# Patient Record
Sex: FEMALE | Race: WHITE | Employment: OTHER | URBAN - METROPOLITAN AREA
[De-identification: names, ages, dates, MRNs, and addresses within clinical notes are randomized per-mention and may not be internally consistent; named-entity substitution may affect disease eponyms.]

---

## 2019-05-06 PROBLEM — R10.30 LOWER ABDOMINAL PAIN: Status: ACTIVE | Noted: 2019-05-06

## 2019-05-06 PROBLEM — S83.511A RUPTURE OF ANTERIOR CRUCIATE LIGAMENT OF RIGHT KNEE: Status: ACTIVE | Noted: 2019-05-06

## 2019-06-11 ENCOUNTER — HOSPITAL ENCOUNTER (OUTPATIENT)
Dept: LAB | Age: 49
Discharge: HOME | End: 2019-06-11

## 2019-06-11 PROCEDURE — 88305 TISSUE EXAM BY PATHOLOGIST: CPT

## 2019-06-18 ENCOUNTER — HOSPITAL ENCOUNTER (OUTPATIENT)
Dept: GENERAL RADIOLOGY | Age: 49
Discharge: HOME | End: 2019-06-18
Attending: INTERNAL MEDICINE
Payer: MEDICARE

## 2019-06-18 DIAGNOSIS — M45.9 ANKYLOSING SPONDYLITIS, UNSPECIFIED SITE OF SPINE (HCC): ICD-10-CM

## 2019-06-18 PROCEDURE — 72040 X-RAY EXAM NECK SPINE 2-3 VW: CPT

## 2019-06-18 PROCEDURE — 73630 X-RAY EXAM OF FOOT: CPT

## 2019-06-18 PROCEDURE — 72170 X-RAY EXAM OF PELVIS: CPT

## 2019-06-18 PROCEDURE — 72100 X-RAY EXAM L-S SPINE 2/3 VWS: CPT

## 2019-06-18 PROCEDURE — 73130 X-RAY EXAM OF HAND: CPT

## 2019-06-18 PROCEDURE — 72072 X-RAY EXAM THORAC SPINE 3VWS: CPT

## 2019-06-19 RX ORDER — SODIUM CHLORIDE 0.9 % (FLUSH) 0.9 %
5-40 SYRINGE (ML) INJECTION AS NEEDED
Status: CANCELLED | OUTPATIENT
Start: 2019-06-19

## 2019-06-19 RX ORDER — SODIUM CHLORIDE 0.9 % (FLUSH) 0.9 %
5-40 SYRINGE (ML) INJECTION EVERY 8 HOURS
Status: CANCELLED | OUTPATIENT
Start: 2019-06-19

## 2019-06-21 ENCOUNTER — ANESTHESIA EVENT (OUTPATIENT)
Dept: SURGERY | Age: 49
End: 2019-06-21
Payer: MEDICARE

## 2019-06-24 ENCOUNTER — ANESTHESIA (OUTPATIENT)
Dept: SURGERY | Age: 49
End: 2019-06-24
Payer: MEDICARE

## 2019-06-24 ENCOUNTER — HOSPITAL ENCOUNTER (OUTPATIENT)
Age: 49
Setting detail: OUTPATIENT SURGERY
Discharge: HOME | End: 2019-06-24
Attending: ORTHOPAEDIC SURGERY | Admitting: ORTHOPAEDIC SURGERY
Payer: MEDICARE

## 2019-06-24 VITALS
WEIGHT: 175 LBS | DIASTOLIC BLOOD PRESSURE: 67 MMHG | TEMPERATURE: 98.4 F | HEIGHT: 67 IN | BODY MASS INDEX: 27.47 KG/M2 | RESPIRATION RATE: 17 BRPM | SYSTOLIC BLOOD PRESSURE: 134 MMHG | OXYGEN SATURATION: 98 % | HEART RATE: 76 BPM

## 2019-06-24 PROCEDURE — 77030006891 HC BLD SHV RESECT STRY -B: Performed by: ORTHOPAEDIC SURGERY

## 2019-06-24 PROCEDURE — 77030019908 HC STETH ESOPH SIMS -A: Performed by: REGISTERED NURSE

## 2019-06-24 PROCEDURE — 76210000063 HC OR PH I REC FIRST 0.5 HR: Performed by: ORTHOPAEDIC SURGERY

## 2019-06-24 PROCEDURE — 77030018836 HC SOL IRR NACL ICUM -A: Performed by: ORTHOPAEDIC SURGERY

## 2019-06-24 PROCEDURE — 76010010054 HC POST OP PAIN BLOCK

## 2019-06-24 PROCEDURE — 74011000250 HC RX REV CODE- 250

## 2019-06-24 PROCEDURE — 77030029203 HC IRR KT CYSTO/TUR BBMI -A: Performed by: ORTHOPAEDIC SURGERY

## 2019-06-24 PROCEDURE — 77030002966 HC SUT PDS J&J -A: Performed by: ORTHOPAEDIC SURGERY

## 2019-06-24 PROCEDURE — 77030004453 HC BUR SHV STRY -B: Performed by: ORTHOPAEDIC SURGERY

## 2019-06-24 PROCEDURE — 76210000020 HC REC RM PH II FIRST 0.5 HR: Performed by: ORTHOPAEDIC SURGERY

## 2019-06-24 PROCEDURE — C1713 ANCHOR/SCREW BN/BN,TIS/BN: HCPCS | Performed by: ORTHOPAEDIC SURGERY

## 2019-06-24 PROCEDURE — 77030033138 HC SUT PGA STRATFX J&J -B: Performed by: ORTHOPAEDIC SURGERY

## 2019-06-24 PROCEDURE — 76942 ECHO GUIDE FOR BIOPSY: CPT | Performed by: ORTHOPAEDIC SURGERY

## 2019-06-24 PROCEDURE — 77030000032 HC CUF TRNQT ZIMM -B: Performed by: ORTHOPAEDIC SURGERY

## 2019-06-24 PROCEDURE — 77030003602 HC NDL NRV BLK BBMI -B: Performed by: ANESTHESIOLOGY

## 2019-06-24 PROCEDURE — 77030018986 HC SUT ETHBND4 J&J -B: Performed by: ORTHOPAEDIC SURGERY

## 2019-06-24 PROCEDURE — 76010010054 HC POST OP PAIN BLOCK: Performed by: ORTHOPAEDIC SURGERY

## 2019-06-24 PROCEDURE — 74011250636 HC RX REV CODE- 250/636: Performed by: ANESTHESIOLOGY

## 2019-06-24 PROCEDURE — 76010000162 HC OR TIME 1.5 TO 2 HR INTENSV-TIER 1: Performed by: ORTHOPAEDIC SURGERY

## 2019-06-24 PROCEDURE — 77030027384 HC PRB ARTHSCP SERFAS STRY -C: Performed by: ORTHOPAEDIC SURGERY

## 2019-06-24 PROCEDURE — 74011250636 HC RX REV CODE- 250/636

## 2019-06-24 PROCEDURE — C1762 CONN TISS, HUMAN(INC FASCIA): HCPCS | Performed by: ORTHOPAEDIC SURGERY

## 2019-06-24 PROCEDURE — 77030006788 HC BLD SAW OSC STRY -B: Performed by: ORTHOPAEDIC SURGERY

## 2019-06-24 PROCEDURE — 77030002982 HC SUT POLYSRB J&J -A: Performed by: ORTHOPAEDIC SURGERY

## 2019-06-24 PROCEDURE — 77030010430: Performed by: ORTHOPAEDIC SURGERY

## 2019-06-24 PROCEDURE — 74011250637 HC RX REV CODE- 250/637: Performed by: ANESTHESIOLOGY

## 2019-06-24 PROCEDURE — 76060000034 HC ANESTHESIA 1.5 TO 2 HR: Performed by: ORTHOPAEDIC SURGERY

## 2019-06-24 PROCEDURE — 77030006590 HC BLD ARTHSC GRFT J&J -C: Performed by: ORTHOPAEDIC SURGERY

## 2019-06-24 PROCEDURE — 74011250636 HC RX REV CODE- 250/636: Performed by: ORTHOPAEDIC SURGERY

## 2019-06-24 DEVICE — 7 X 20 MM BIORCI SCREW
Type: IMPLANTABLE DEVICE | Site: KNEE | Status: FUNCTIONAL
Brand: BIORCI

## 2019-06-24 DEVICE — GRAFT BNE L10XW25XH10MM BNE TEND BNE PRESHAPED: Type: IMPLANTABLE DEVICE | Site: KNEE | Status: FUNCTIONAL

## 2019-06-24 RX ORDER — PROPOFOL 10 MG/ML
INJECTION, EMULSION INTRAVENOUS AS NEEDED
Status: DISCONTINUED | OUTPATIENT
Start: 2019-06-24 | End: 2019-06-24 | Stop reason: HOSPADM

## 2019-06-24 RX ORDER — FENTANYL CITRATE 50 UG/ML
100 INJECTION, SOLUTION INTRAMUSCULAR; INTRAVENOUS ONCE
Status: COMPLETED | OUTPATIENT
Start: 2019-06-24 | End: 2019-06-24

## 2019-06-24 RX ORDER — EPHEDRINE SULFATE 50 MG/ML
INJECTION, SOLUTION INTRAVENOUS AS NEEDED
Status: DISCONTINUED | OUTPATIENT
Start: 2019-06-24 | End: 2019-06-24 | Stop reason: HOSPADM

## 2019-06-24 RX ORDER — FAMOTIDINE 20 MG/1
20 TABLET, FILM COATED ORAL ONCE
Status: COMPLETED | OUTPATIENT
Start: 2019-06-24 | End: 2019-06-24

## 2019-06-24 RX ORDER — SODIUM CHLORIDE, SODIUM LACTATE, POTASSIUM CHLORIDE, CALCIUM CHLORIDE 600; 310; 30; 20 MG/100ML; MG/100ML; MG/100ML; MG/100ML
150 INJECTION, SOLUTION INTRAVENOUS CONTINUOUS
Status: DISCONTINUED | OUTPATIENT
Start: 2019-06-24 | End: 2019-06-24 | Stop reason: HOSPADM

## 2019-06-24 RX ORDER — SODIUM CHLORIDE 9 MG/ML
50 INJECTION, SOLUTION INTRAVENOUS CONTINUOUS
Status: DISCONTINUED | OUTPATIENT
Start: 2019-06-24 | End: 2019-06-24 | Stop reason: HOSPADM

## 2019-06-24 RX ORDER — LIDOCAINE HYDROCHLORIDE 10 MG/ML
0.1 INJECTION INFILTRATION; PERINEURAL AS NEEDED
Status: DISCONTINUED | OUTPATIENT
Start: 2019-06-24 | End: 2019-06-24 | Stop reason: HOSPADM

## 2019-06-24 RX ORDER — ACETAMINOPHEN 500 MG
1000 TABLET ORAL
Status: DISCONTINUED | OUTPATIENT
Start: 2019-06-24 | End: 2019-06-24 | Stop reason: HOSPADM

## 2019-06-24 RX ORDER — SODIUM CHLORIDE 0.9 % (FLUSH) 0.9 %
5-40 SYRINGE (ML) INJECTION AS NEEDED
Status: DISCONTINUED | OUTPATIENT
Start: 2019-06-24 | End: 2019-06-24 | Stop reason: HOSPADM

## 2019-06-24 RX ORDER — DEXAMETHASONE SODIUM PHOSPHATE 4 MG/ML
INJECTION, SOLUTION INTRA-ARTICULAR; INTRALESIONAL; INTRAMUSCULAR; INTRAVENOUS; SOFT TISSUE AS NEEDED
Status: DISCONTINUED | OUTPATIENT
Start: 2019-06-24 | End: 2019-06-24 | Stop reason: HOSPADM

## 2019-06-24 RX ORDER — ONDANSETRON 2 MG/ML
INJECTION INTRAMUSCULAR; INTRAVENOUS AS NEEDED
Status: DISCONTINUED | OUTPATIENT
Start: 2019-06-24 | End: 2019-06-24 | Stop reason: HOSPADM

## 2019-06-24 RX ORDER — SODIUM CHLORIDE 0.9 % (FLUSH) 0.9 %
5-40 SYRINGE (ML) INJECTION EVERY 8 HOURS
Status: DISCONTINUED | OUTPATIENT
Start: 2019-06-24 | End: 2019-06-24 | Stop reason: HOSPADM

## 2019-06-24 RX ORDER — CEFAZOLIN SODIUM/WATER 2 G/20 ML
2 SYRINGE (ML) INTRAVENOUS ONCE
Status: COMPLETED | OUTPATIENT
Start: 2019-06-24 | End: 2019-06-24

## 2019-06-24 RX ORDER — LIDOCAINE HYDROCHLORIDE 20 MG/ML
INJECTION, SOLUTION EPIDURAL; INFILTRATION; INTRACAUDAL; PERINEURAL AS NEEDED
Status: DISCONTINUED | OUTPATIENT
Start: 2019-06-24 | End: 2019-06-24 | Stop reason: HOSPADM

## 2019-06-24 RX ORDER — MIDAZOLAM HYDROCHLORIDE 1 MG/ML
2 INJECTION, SOLUTION INTRAMUSCULAR; INTRAVENOUS
Status: COMPLETED | OUTPATIENT
Start: 2019-06-24 | End: 2019-06-24

## 2019-06-24 RX ORDER — HYDROCODONE BITARTRATE AND ACETAMINOPHEN 5; 325 MG/1; MG/1
1 TABLET ORAL AS NEEDED
Status: DISCONTINUED | OUTPATIENT
Start: 2019-06-24 | End: 2019-06-24 | Stop reason: HOSPADM

## 2019-06-24 RX ORDER — HYDROMORPHONE HYDROCHLORIDE 2 MG/ML
0.5 INJECTION, SOLUTION INTRAMUSCULAR; INTRAVENOUS; SUBCUTANEOUS
Status: DISCONTINUED | OUTPATIENT
Start: 2019-06-24 | End: 2019-06-24 | Stop reason: HOSPADM

## 2019-06-24 RX ADMIN — EPHEDRINE SULFATE 10 MG: 50 INJECTION, SOLUTION INTRAVENOUS at 08:57

## 2019-06-24 RX ADMIN — FENTANYL CITRATE 100 MCG: 50 INJECTION INTRAMUSCULAR; INTRAVENOUS at 07:24

## 2019-06-24 RX ADMIN — ONDANSETRON 4 MG: 2 INJECTION INTRAMUSCULAR; INTRAVENOUS at 09:48

## 2019-06-24 RX ADMIN — SODIUM CHLORIDE, SODIUM LACTATE, POTASSIUM CHLORIDE, AND CALCIUM CHLORIDE 150 ML/HR: 600; 310; 30; 20 INJECTION, SOLUTION INTRAVENOUS at 06:30

## 2019-06-24 RX ADMIN — ACETAMINOPHEN 1000 MG: 500 TABLET, FILM COATED ORAL at 10:39

## 2019-06-24 RX ADMIN — FAMOTIDINE 20 MG: 20 TABLET ORAL at 07:15

## 2019-06-24 RX ADMIN — EPHEDRINE SULFATE 5 MG: 50 INJECTION, SOLUTION INTRAVENOUS at 09:45

## 2019-06-24 RX ADMIN — EPHEDRINE SULFATE 5 MG: 50 INJECTION, SOLUTION INTRAVENOUS at 09:06

## 2019-06-24 RX ADMIN — DEXAMETHASONE SODIUM PHOSPHATE 4 MG: 4 INJECTION, SOLUTION INTRA-ARTICULAR; INTRALESIONAL; INTRAMUSCULAR; INTRAVENOUS; SOFT TISSUE at 08:46

## 2019-06-24 RX ADMIN — SODIUM CHLORIDE, SODIUM LACTATE, POTASSIUM CHLORIDE, AND CALCIUM CHLORIDE: 600; 310; 30; 20 INJECTION, SOLUTION INTRAVENOUS at 09:46

## 2019-06-24 RX ADMIN — EPHEDRINE SULFATE 5 MG: 50 INJECTION, SOLUTION INTRAVENOUS at 09:30

## 2019-06-24 RX ADMIN — PROPOFOL 200 MG: 10 INJECTION, EMULSION INTRAVENOUS at 08:40

## 2019-06-24 RX ADMIN — EPHEDRINE SULFATE 5 MG: 50 INJECTION, SOLUTION INTRAVENOUS at 08:54

## 2019-06-24 RX ADMIN — LIDOCAINE HYDROCHLORIDE 100 MG: 20 INJECTION, SOLUTION EPIDURAL; INFILTRATION; INTRACAUDAL; PERINEURAL at 08:39

## 2019-06-24 RX ADMIN — Medication 2 G: at 08:46

## 2019-06-24 RX ADMIN — MIDAZOLAM HYDROCHLORIDE 2 MG: 2 INJECTION, SOLUTION INTRAMUSCULAR; INTRAVENOUS at 07:24

## 2019-06-24 NOTE — ANESTHESIA PREPROCEDURE EVALUATION
Relevant Problems   No relevant active problems       Anesthetic History   No history of anesthetic complications            Review of Systems / Medical History  Patient summary reviewed and pertinent labs reviewed    Pulmonary  Within defined limits                 Neuro/Psych         Psychiatric history (anxiety)     Cardiovascular  Within defined limits                Exercise tolerance: >4 METS     GI/Hepatic/Renal     GERD: well controlled           Endo/Other  Within defined limits           Other Findings              Physical Exam    Airway  Mallampati: II  TM Distance: 4 - 6 cm  Neck ROM: normal range of motion   Mouth opening: Normal     Cardiovascular  Regular rate and rhythm,  S1 and S2 normal,  no murmur, click, rub, or gallop  Rhythm: regular  Rate: normal         Dental  No notable dental hx       Pulmonary  Breath sounds clear to auscultation               Abdominal         Other Findings            Anesthetic Plan    ASA: 2  Anesthesia type: general      Post-op pain plan if not by surgeon: peripheral nerve block single    Induction: Intravenous  Anesthetic plan and risks discussed with: Patient and Son / Daughter

## 2019-06-24 NOTE — ANESTHESIA PROCEDURE NOTES
Peripheral Block    Start time: 6/24/2019 7:24 AM  End time: 6/24/2019 7:33 AM  Performed by: Yobany Mchugh MD  Authorized by: Yobany Mchugh MD       Pre-procedure: Indications: at surgeon's request and post-op pain management    Preanesthetic Checklist: patient identified, risks and benefits discussed, site marked, timeout performed, anesthesia consent given and patient being monitored    Timeout Time: 07:24          Block Type:   Block Type:  Popliteal  Laterality:  Right  Monitoring:  Standard ASA monitoring, responsive to questions, oxygen, continuous pulse ox, frequent vital sign checks and heart rate  Injection Technique:  Single shot  Procedures: ultrasound guided and nerve stimulator    Patient Position: left lateral decubitus (lateral decubitus)  Prep: chlorhexidine    Location:  Mid thigh  Needle Type:  Stimuplex  Needle Gauge:  22 G  Needle Localization:  Nerve stimulator and ultrasound guidance  Motor Response: minimal motor response >0.4 mA      Assessment:  Number of attempts:  1  Injection Assessment:  Incremental injection every 5 mL, local visualized surrounding nerve on ultrasound, negative aspiration for blood, no intravascular symptoms, no paresthesia and ultrasound image on chart  Patient tolerance:  Patient tolerated the procedure well with no immediate complications  All needles out intact, proc. Tolerated well.

## 2019-06-24 NOTE — ANESTHESIA PROCEDURE NOTES
Peripheral Block    Start time: 6/24/2019 7:34 AM  End time: 6/24/2019 7:38 AM  Performed by: Melissa Leyva MD  Authorized by: Melissa Leyva MD       Pre-procedure: Indications: at surgeon's request and post-op pain management    Preanesthetic Checklist: patient identified, risks and benefits discussed, site marked, timeout performed, anesthesia consent given and patient being monitored    Timeout Time: 07:34          Block Type:   Block Type: Adductor canal  Laterality:  Right  Monitoring:  Standard ASA monitoring, responsive to questions, oxygen, continuous pulse ox, frequent vital sign checks and heart rate  Injection Technique:  Single shot  Procedures: ultrasound guided    Patient Position: supine  Prep: chlorhexidine    Location:  Mid thigh  Needle Type:  Stimuplex  Needle Gauge:  22 G  Needle Localization:  Ultrasound guidance    Assessment:  Number of attempts:  1  Injection Assessment:  Incremental injection every 5 mL, local visualized surrounding nerve on ultrasound, negative aspiration for blood, no intravascular symptoms, no paresthesia and ultrasound image on chart  Patient tolerance:  Patient tolerated the procedure well with no immediate complications  All needles out intact, proc. Tolerated well.

## 2019-06-24 NOTE — ANESTHESIA POSTPROCEDURE EVALUATION
Procedure(s):  RIGHT KNEE ARTHROSCOPY WITH ANTERIOR CRUCIATE LIGAMENT RECONSTRUCTION WITH PATELLA TENDON ALLOGRAFT AND MEDIAL MENISCECTOMY. general    Anesthesia Post Evaluation      Multimodal analgesia: multimodal analgesia used between 6 hours prior to anesthesia start to PACU discharge  Patient location during evaluation: bedside  Patient participation: complete - patient participated  Level of consciousness: awake and alert  Pain management: adequate  Airway patency: patent  Anesthetic complications: no  Cardiovascular status: hemodynamically stable  Respiratory status: spontaneous ventilation  Hydration status: euvolemic  Comments: Patient stable and may discharge at this time. Good result with popliteal and adductor canal blocks. Vitals Value Taken Time   /69 6/24/2019 10:59 AM   Temp 36.7 °C (98.1 °F) 6/24/2019 10:07 AM   Pulse 76 6/24/2019 10:58 AM   Resp 14 6/24/2019 10:58 AM   SpO2 94 % 6/24/2019 10:58 AM   Vitals shown include unvalidated device data.

## 2019-06-24 NOTE — DISCHARGE INSTRUCTIONS
ACTIVITY  · As tolerated and as directed by your doctor. · Bathe or shower as directed by your doctor. DIET  · Clear liquids until no nausea or vomiting; then light diet for the first day. · Advance to regular diet on second day, unless your doctor orders otherwise. · If nausea and vomiting continues, call your doctor. PAIN  · Take pain medication as directed by your doctor. · Call your doctor if pain is NOT relieved by medication. · DO NOT take aspirin of blood thinners unless directed by your doctor. DRESSING CARE       CALL YOUR DOCTOR IF   · Excessive bleeding that does not stop after holding pressure over the area  · Temperature of 101 degrees F or above  · Excessive redness, swelling or bruising, and/ or green or yellow, smelly discharge from incision    AFTER ANESTHESIA   · For the first 24 hours: DO NOT Drive, Drink alcoholic beverages, or Make important decisions. · Be aware of dizziness following anesthesia and while taking pain medication. APPOINTMENT DATE/ TIME    YOUR DOCTOR'S PHONE NUMBER       DISCHARGE SUMMARY from Nurse    PATIENT INSTRUCTIONS:    After general anesthesia or intravenous sedation, for 24 hours or while taking prescription Narcotics:  · Limit your activities  · Do not drive and operate hazardous machinery  · Do not make important personal or business decisions  · Do  not drink alcoholic beverages  · If you have not urinated within 8 hours after discharge, please contact your surgeon on call. *  Please give a list of your current medications to your Primary Care Provider. *  Please update this list whenever your medications are discontinued, doses are      changed, or new medications (including over-the-counter products) are added. *  Please carry medication information at all times in case of emergency situations.       These are general instructions for a healthy lifestyle:    No smoking/ No tobacco products/ Avoid exposure to second hand smoke    Surgeon General's Warning:  Quitting smoking now greatly reduces serious risk to your health. Obesity, smoking, and sedentary lifestyle greatly increases your risk for illness    A healthy diet, regular physical exercise & weight monitoring are important for maintaining a healthy lifestyle    You may be retaining fluid if you have a history of heart failure or if you experience any of the following symptoms:  Weight gain of 3 pounds or more overnight or 5 pounds in a week, increased swelling in our hands or feet or shortness of breath while lying flat in bed. Please call your doctor as soon as you notice any of these symptoms; do not wait until your next office visit. Recognize signs and symptoms of STROKE:    F-face looks uneven    A-arms unable to move or move unevenly    S-speech slurred or non-existent    T-time-call 911 as soon as signs and symptoms begin-DO NOT go       Back to bed or wait to see if you get better-TIME IS BRAIN. Post-Operative Instructions   For  Anterior Cruciate Ligament Reconstruction  Phone:  [phone]    1. Unless otherwise instructed, you may place as much weight as tolerated on the operated leg. Use crutches to help with ambulation. 2.  If you do not have an \"Iceman\" type cooling unit, for the first 48-72 hours following surgery, use ice on the knee every two hours (while awake) for 20-30 minutes at a time to help prevent swelling and lessen pain. If you have a cooling unit, follow the instructions given to you- continually as much as possible the first 48-72 hours, then 3-4 times a day for 4 weeks. Elevate leg. 3. You have been given a hinged brace. Keep the brace locked in a straight position at all times until you begin therapy. 4. You may remove the brace to shower and wrap the dressings to keep them dry. 5. Use any pain medication as instructed. You should take your pain medication as soon as you feel the anesthetic wearing off.   Do not wait until you are in severe pain to begin taking your pain medication. 6. You may have some side effects from your pain medication. If you have nausea, try taking your medication with food. For itching, you may take over the counter Benadryl. 7. Begin therapy as ordered    8. You may have been given a prescription for Zofran or Phenergan. This medication is used for nausea and vomiting. You do not need to get this prescription filled unless you have a problem. 9. If you have a problem, please call [address] [place] [address] at [phone]    Claritza Quinones MD    [employer] Post-Operative Instructions   For  Anterior Cruciate Ligament Reconstruction  Phone:  [phone]    1. Unless otherwise instructed, you may place as much weight as tolerated on the operated leg. Use crutches to help with ambulation. 2.  If you do not have an \"Iceman\" type cooling unit, for the first 48-72 hours following surgery, use ice on the knee every two hours (while awake) for 20-30 minutes at a time to help prevent swelling and lessen pain. If you have a cooling unit, follow the instructions given to you- continually as much as possible the first 48-72 hours, then 3-4 times a day for 4 weeks. Elevate leg. 3. You have been given a hinged brace. Keep the brace locked in a straight position at all times until you begin therapy. 4. You may remove the brace to shower and wrap the dressings to keep them dry. 5. Use any pain medication as instructed. You should take your pain medication as soon as you feel the anesthetic wearing off. Do not wait until you are in severe pain to begin taking your pain medication. 6. You may have some side effects from your pain medication. If you have nausea, try taking your medication with food. For itching, you may take over the counter Benadryl. 7. Begin therapy as ordered    8.  You may have been given a prescription for Zofran or Phenergan. This medication is used for nausea and vomiting. You do not need to get this prescription filled unless you have a problem. 9. If you have a problem, please call [email or web address] at [phone]    Eagle Jernigan MD    [facility] Post-Operative Instructions   For  Anterior Cruciate Ligament Reconstruction  Phone:  [phone]    1. Unless otherwise instructed, you may place as much weight as tolerated on the operated leg. Use crutches to help with ambulation. 2.  If you do not have an \"Iceman\" type cooling unit, for the first 48-72 hours following surgery, use ice on the knee every two hours (while awake) for 20-30 minutes at a time to help prevent swelling and lessen pain. If you have a cooling unit, follow the instructions given to you- continually as much as possible the first 48-72 hours, then 3-4 times a day for 4 weeks. Elevate leg. 3. You have been given a hinged brace. Keep the brace locked in a straight position at all times until you begin therapy. 4. You may remove the brace to shower and wrap the dressings to keep them dry. 5. Use any pain medication as instructed. You should take your pain medication as soon as you feel the anesthetic wearing off. Do not wait until you are in severe pain to begin taking your pain medication. 6. You may have some side effects from your pain medication. If you have nausea, try taking your medication with food. For itching, you may take over the counter Benadryl. 7. Begin therapy as ordered    8. You may have been given a prescription for Zofran or Phenergan. This medication is used for nausea and vomiting. You do not need to get this prescription filled unless you have a problem. 9. If you have a problem, please call [employer] at [phone]    Eagle Jernigan MD    [facility]

## 2019-06-24 NOTE — BRIEF OP NOTE
BRIEF OPERATIVE NOTE    Date of Procedure: 6/24/2019   Preoperative Diagnosis: Sprain of anterior cruciate ligament of right knee, initial encounter [S83.511A]  Complex tear of medial meniscus of right knee as current injury, initial encounter [S83.231A]  Postoperative Diagnosis: Right Knee ACL Tear/ Medial Meniscus Tear     Procedure(s):  RIGHT KNEE ARTHROSCOPY WITH ANTERIOR CRUCIATE LIGAMENT RECONSTRUCTION WITH PATELLA TENDON ALLOGRAFT AND MEDIAL MENISCECTOMY  Surgeon(s) and Role: Akin Douglas MD - Primary         Surgical Assistant:         Surgical Staff:  Circ-1: Huang Stafford RN  Circ-2: Macey Ramon  Scrub Tech-1: Heather Plaza  Scrub Tech-2: Maryann Lyons  Event Time In Time Out   Incision Start 0900    Incision Close 9042      Anesthesia: General   Estimated Blood Loss:     Specimens: * No specimens in log *   Findings:      Complications:     Implants:   Implant Name Type Inv.  Item Serial No.  Lot No. LRB No. Used Action   BONE TEND E [facility] -- 10/10/35-45MM - [id number]  BONE TEND BNE PAT [facility] PRE [facility] -- 10/10/35-45MM [id number] [employer] [id number] Right 1 Implanted   SCR INTFR BIOABSRB BIORCI 7X20 --  - [id number]  SCR INTFR Maryam Lira [employer] [id number] Right 2 Implanted

## 2019-06-24 NOTE — H&P
Outpatient Surgery History and Physical:  Serene Hernandez was seen and examined. CHIEF COMPLAINT:   r knee . PE:     Visit Vitals  /83 (BP 1 Location: Left arm, BP Patient Position: Supine)   Pulse 68   Temp 98.5 °F (36.9 °C)   Resp 18   Ht 5' 7\" (1.702 m)   Wt 79.4 kg (175 lb)   LMP 11/11/2012   SpO2 100%   BMI 27.41 kg/m²       Heart:   Regular rhythm      Lungs:  Are clear      Past Medical History:    Patient Active Problem List    Diagnosis    Rupture of anterior cruciate ligament of right knee    Lower abdominal pain    Idiopathic scoliosis of lumbar spine    S/P gastric bypass    Chronic bilateral low back pain with sciatica     Sees Pain management      B12 deficiency    ADD (attention deficit disorder)     Managed by Psych.  Recurrent depression ([facility] [address]    History of cervical cancer in adulthood    Hypothyroidism, adult    CERVIX  (UTERUS) CARCINOMA IN SITU       Surgical History:   Past Surgical History:   Procedure Laterality Date    HX BREAST REDUCTION      HX CHOLECYSTECTOMY      HX COLONOSCOPY      HX GASTRIC BYPASS  2013    HX GI      bladder lift    HX GYN      ovarian cyst removal.     HX OTHER SURGICAL  2015    tummy tuck    HX TASHA AND BSO  2012    with lymp nodes    HX TONSILLECTOMY         Social History: Patient  reports that she has quit smoking. She has a 2.00 pack-year smoking history. She has never used smokeless tobacco. She reports that she does not drink alcohol or use drugs.     Family History:   Family History   Problem Relation Age of Onset    Lung Disease Mother         copd, emphysema    Hypertension Father     Arthritis-osteo Father        Allergies: Reviewed per EMR  Allergies   Allergen Reactions    Augmentin [Amoxicillin-Pot Clavulanate] Itching    Sulfa (Sulfonamide Antibiotics) Anaphylaxis    Codeine Anxiety    Lortab [Hydrocodone-Acetaminophen] Nausea Only    Pcn [Penicillins] Itching       Medications:    No current facility-administered medications on file prior to encounter. Current Outpatient Medications on File Prior to Encounter   Medication Sig    FERROUS FUMARATE (IRON PO) Take  by mouth.  L. RHAMNOSUS GG/INULIN (CULTURELLE PROBIOTICS PO) Take  by mouth.  multivitamin (ONE A DAY) tablet Take 1 Tab by mouth daily.  potassium 99 mg tablet Take 99 mg by mouth daily.  PARoxetine (PAXIL) 10 mg tablet Take 60 mg by mouth daily.  clonazePAM (KLONOPIN) 2 mg tablet Take 1 mg by mouth as needed. The surgery is planned for the  Knee . History and physical has been reviewed. The patient has been examined. There have been no significant clinical changes since the completion of the originally dated History and Physical.  Patient identified by surgeon; surgical site was confirmed by patient and surgeon. The patient is here today for outpatient surgery. I have examined the patient, no changes are noted in the patient's medical status. Necessity for the procedure/care is still present and the history and physical above is current. See the office notes for the full long term history of the problem. Please see the recent office notes for the musculoskeletal examination.     Signed By: Hanh Noe MD     June 24, 2019 6:58 AM

## 2019-06-24 NOTE — OP NOTES
[facility]  OPERATIVE REPORT    Name:  Reyes Liberty  MR#:  [id number]  :  1970  ACCOUNT #:  [de-identified]  DATE OF SERVICE:  2019    PREOPERATIVE DIAGNOSIS:  Anterior cruciate ligament tear of the right knee with medial meniscus tear. POSTOPERATIVE DIAGNOSES:  1. Tear of the right anterior cruciate ligament. 2.  Large tear of the medial meniscus. 3.  Small flap tear of the lateral meniscus. 4.  Mild chondromalacia of the patellofemoral joint. 5.  Small area of chondromalacia of the medial femoral condyle. PROCEDURE PERFORMED:  1. Arthroscopic-assisted ACL reconstruction using central third of the bone-tendon-bone allograft. 2.  Medial and lateral meniscectomy. SURGEON:  Weston Blakely MD    ASSISTANT:  None. ANESTHESIA:  General.    COMPLICATIONS:  None. SPECIMENS REMOVED:  None. IMPLANTS:  None. ESTIMATED BLOOD LOSS:  Minimal.    PROCEDURE:  After an adequate level of general anesthesia was obtained, the right leg was prepped and draped in usual sterile fashion. She had a very positive anterior drawer, Lachman, and pivot shift. She was stable on extension. There was no increased external rotation. She received antibiotics and had a block. The knee was arthroscoped initially. There was some mild chondromalacia of the undersurface of the patella and a chondroplasty was performed with the shaver. There was no eburnated bone. The gutters were clear, just a small amount chondromalacia involving the medial femoral condyle but no eburnated bone, but she had a very large tear of the medial meniscus with a stump of the posterior horn which had displaced into the intercondylar notch and this was resected with the basket and the shaver. There was also a stump at the middle third resected with the basket and the shaver. It was left with a stable rim but there was some mild chondromalacia medially.   In the lateral compartment, it looked as though there was an old healing tear of the lateral meniscus which was very stable. There was a small radial tear at the middle third resected with the basket and the shaver, but otherwise, the meniscus was very stable particularly posteriorly. The articular surface laterally looked good. A notchplasty was performed as the patient had a tear of the ACL and the old ACL was debrided. The PCL was intact. Somewhat of a stenotic notch. A notchplasty was performed. The ACL footprint was exposed. The graft was prepared on the back table. An allograft was used and contoured to 10 mm. Sutures were placed. The tibial guide was used to drill the tibial tunnel after exposing the medial cortex. It was drilled to 10 mm and the edges were chamfered. Just anterior to the posterior cortex, the posterior wall was identified during the notchplasty and the femoral footprint was isolated. This was drilled to 10 mm. The tunnels were cleared of debris, and then with the passing wire, the graft was pulled snuggly into the two sites. There was excellent positioning of both sides without any impingement of the graft and then fixation with Smith and Nephew absorbable interference screws on both ends. The knee was very stable after this and photos made for the patient. Hemostasis was obtained. The incisions were closed with Vicryl in the deep tissue, Monocryl in the subcutaneous and subcuticular stitches. Steri-Strips and sterile dressings were applied. The leg was placed in a hinged knee brace locked in full extension. Instructions given to the family. She tolerated the procedure well.       Genevieve Shaw MD      [id number]  D:  06/24/2019 10:01  T:  06/24/2019 10:05  JOB #:  [id number]  CC:  [address] [place] [address]

## 2019-07-09 ENCOUNTER — HOSPITAL ENCOUNTER (OUTPATIENT)
Dept: GENERAL RADIOLOGY | Age: 49
Discharge: HOME | End: 2019-07-09
Attending: INTERNAL MEDICINE
Payer: MEDICARE

## 2019-07-09 DIAGNOSIS — R76.12 POSITIVE QUANTIFERON-TB GOLD TEST: ICD-10-CM

## 2019-07-09 PROCEDURE — 71046 X-RAY EXAM CHEST 2 VIEWS: CPT

## 2019-08-09 ENCOUNTER — HOSPITAL ENCOUNTER (OUTPATIENT)
Dept: MRI IMAGING | Age: 49
Discharge: HOME | End: 2019-08-09
Attending: INTERNAL MEDICINE
Payer: MEDICARE

## 2019-08-09 DIAGNOSIS — M45.A0 NON-RADIOGRAPHIC AXIAL SPONDYLOARTHRITIS (HCC): ICD-10-CM

## 2019-08-09 PROCEDURE — 72195 MRI PELVIS W/O DYE: CPT

## 2019-08-20 PROBLEM — M45.6 ANKYLOSING SPONDYLITIS OF LUMBAR REGION (HCC): Status: ACTIVE | Noted: 2019-08-20

## 2019-08-20 PROBLEM — D50.9 IDA (IRON DEFICIENCY ANEMIA): Status: ACTIVE | Noted: 2019-08-20

## 2019-10-11 ENCOUNTER — HOSPITAL ENCOUNTER (EMERGENCY)
Age: 49
Discharge: HOME | End: 2019-10-12
Attending: EMERGENCY MEDICINE
Payer: MEDICARE

## 2019-10-11 DIAGNOSIS — F41.1 ANXIETY STATE: Primary | ICD-10-CM

## 2019-10-11 PROCEDURE — 99284 EMERGENCY DEPT VISIT MOD MDM: CPT | Performed by: EMERGENCY MEDICINE

## 2019-10-12 ENCOUNTER — HOSPITAL ENCOUNTER (EMERGENCY)
Age: 49
Discharge: HOME | End: 2019-10-14
Attending: EMERGENCY MEDICINE
Payer: MEDICARE

## 2019-10-12 VITALS
RESPIRATION RATE: 18 BRPM | OXYGEN SATURATION: 98 % | HEART RATE: 77 BPM | SYSTOLIC BLOOD PRESSURE: 92 MMHG | WEIGHT: 178.63 LBS | TEMPERATURE: 98.6 F | BODY MASS INDEX: 28.04 KG/M2 | DIASTOLIC BLOOD PRESSURE: 61 MMHG | HEIGHT: 67 IN

## 2019-10-12 DIAGNOSIS — F31.89 MANIC AFFECTIVE DISORDER WITH RECURRENT EPISODE (HCC): Primary | ICD-10-CM

## 2019-10-12 LAB
ALBUMIN SERPL-MCNC: 4.1 G/DL (ref 3.5–5)
ALBUMIN/GLOB SERPL: 1.5 {RATIO} (ref 1.2–3.5)
ALP SERPL-CCNC: 123 U/L (ref 50–136)
ALT SERPL-CCNC: 15 U/L (ref 12–65)
AMPHET UR QL SCN: NEGATIVE
ANION GAP SERPL CALC-SCNC: 8 MMOL/L (ref 7–16)
AST SERPL-CCNC: 19 U/L (ref 15–37)
BARBITURATES UR QL SCN: NEGATIVE
BASOPHILS # BLD: 0.1 K/UL (ref 0–0.2)
BASOPHILS NFR BLD: 1 % (ref 0–2)
BENZODIAZ UR QL: NEGATIVE
BILIRUB SERPL-MCNC: 0.6 MG/DL (ref 0.2–1.1)
BUN SERPL-MCNC: 12 MG/DL (ref 6–23)
CALCIUM SERPL-MCNC: 9 MG/DL (ref 8.3–10.4)
CANNABINOIDS UR QL SCN: POSITIVE
CHLORIDE SERPL-SCNC: 108 MMOL/L (ref 98–107)
CO2 SERPL-SCNC: 26 MMOL/L (ref 21–32)
COCAINE UR QL SCN: NEGATIVE
CREAT SERPL-MCNC: 0.95 MG/DL (ref 0.6–1)
DIFFERENTIAL METHOD BLD: NORMAL
EOSINOPHIL # BLD: 0.1 K/UL (ref 0–0.8)
EOSINOPHIL NFR BLD: 1 % (ref 0.5–7.8)
ERYTHROCYTE [DISTWIDTH] IN BLOOD BY AUTOMATED COUNT: 12.5 % (ref 11.9–14.6)
ETHANOL SERPL-MCNC: <3 MG/DL
GLOBULIN SER CALC-MCNC: 2.7 G/DL (ref 2.3–3.5)
GLUCOSE SERPL-MCNC: 154 MG/DL (ref 65–100)
HCG UR QL: NEGATIVE
HCT VFR BLD AUTO: 39.4 % (ref 35.8–46.3)
HGB BLD-MCNC: 13.4 G/DL (ref 11.7–15.4)
IMM GRANULOCYTES # BLD AUTO: 0 K/UL (ref 0–0.5)
IMM GRANULOCYTES NFR BLD AUTO: 0 % (ref 0–5)
LYMPHOCYTES # BLD: 1.5 K/UL (ref 0.5–4.6)
LYMPHOCYTES NFR BLD: 29 % (ref 13–44)
MCH RBC QN AUTO: 30.9 PG (ref 26.1–32.9)
MCHC RBC AUTO-ENTMCNC: 34 G/DL (ref 31.4–35)
MCV RBC AUTO: 91 FL (ref 79.6–97.8)
METHADONE UR QL: NEGATIVE
MONOCYTES # BLD: 0.6 K/UL (ref 0.1–1.3)
MONOCYTES NFR BLD: 11 % (ref 4–12)
NEUTS SEG # BLD: 3 K/UL (ref 1.7–8.2)
NEUTS SEG NFR BLD: 58 % (ref 43–78)
NRBC # BLD: 0 K/UL (ref 0–0.2)
OPIATES UR QL: NEGATIVE
PCP UR QL: NEGATIVE
PLATELET # BLD AUTO: 291 K/UL (ref 150–450)
PMV BLD AUTO: 10.4 FL (ref 9.4–12.3)
POTASSIUM SERPL-SCNC: 3.2 MMOL/L (ref 3.5–5.1)
PROT SERPL-MCNC: 6.8 G/DL (ref 6.3–8.2)
RBC # BLD AUTO: 4.33 M/UL (ref 4.05–5.2)
SODIUM SERPL-SCNC: 142 MMOL/L (ref 136–145)
WBC # BLD AUTO: 5.2 K/UL (ref 4.3–11.1)

## 2019-10-12 PROCEDURE — 99285 EMERGENCY DEPT VISIT HI MDM: CPT | Performed by: EMERGENCY MEDICINE

## 2019-10-12 PROCEDURE — 81025 URINE PREGNANCY TEST: CPT

## 2019-10-12 PROCEDURE — 80053 COMPREHEN METABOLIC PANEL: CPT

## 2019-10-12 PROCEDURE — 85025 COMPLETE CBC W/AUTO DIFF WBC: CPT

## 2019-10-12 PROCEDURE — 96372 THER/PROPH/DIAG INJ SC/IM: CPT | Performed by: EMERGENCY MEDICINE

## 2019-10-12 PROCEDURE — 74011250637 HC RX REV CODE- 250/637: Performed by: EMERGENCY MEDICINE

## 2019-10-12 PROCEDURE — 74011250636 HC RX REV CODE- 250/636: Performed by: EMERGENCY MEDICINE

## 2019-10-12 PROCEDURE — 81003 URINALYSIS AUTO W/O SCOPE: CPT | Performed by: EMERGENCY MEDICINE

## 2019-10-12 PROCEDURE — 80307 DRUG TEST PRSMV CHEM ANLYZR: CPT

## 2019-10-12 RX ORDER — NICOTINE 7MG/24HR
1 PATCH, TRANSDERMAL 24 HOURS TRANSDERMAL EVERY 24 HOURS
Status: DISCONTINUED | OUTPATIENT
Start: 2019-10-12 | End: 2019-10-14 | Stop reason: HOSPADM

## 2019-10-12 RX ORDER — LORAZEPAM 2 MG/ML
1 INJECTION INTRAMUSCULAR
Status: COMPLETED | OUTPATIENT
Start: 2019-10-12 | End: 2019-10-12

## 2019-10-12 RX ORDER — LORAZEPAM 1 MG/1
1 TABLET ORAL
Status: COMPLETED | OUTPATIENT
Start: 2019-10-12 | End: 2019-10-12

## 2019-10-12 RX ORDER — OLANZAPINE 5 MG/1
5 TABLET ORAL EVERY EVENING
Status: DISCONTINUED | OUTPATIENT
Start: 2019-10-13 | End: 2019-10-14 | Stop reason: HOSPADM

## 2019-10-12 RX ADMIN — LORAZEPAM 1 MG: 2 INJECTION INTRAMUSCULAR; INTRAVENOUS at 01:00

## 2019-10-12 RX ADMIN — LORAZEPAM 1 MG: 1 TABLET ORAL at 22:34

## 2019-10-12 NOTE — ED NOTES
Pts room mate Elizabeth Teague called in to say pts brother told her pt has not been taking her medictions like she is supposed to. Elizabeth Teague also reports pts kim is at their home safe in her room. Pt had expressed concern about this. Room mate Vivienne requests a call when it is decided if pt is staying or going home as she is the only one home. Pt states at this time it is ok to call her with this information when its decided. Elizabeth Teague [phone] is her cell number.

## 2019-10-12 NOTE — DISCHARGE INSTRUCTIONS

## 2019-10-12 NOTE — ED TRIAGE NOTES
Pt reports to triage in wheelchair, EMS was called for PTSD. Pt reports this began 1.5 hours ago, she has been crying with EMS while talking about psych history. Pt smoke marijuana earlier tonight, no other drugs, pt denies SI/HI. VSS with EMS. /90, HR 72, O2 98% room air. In triage, patient states \"my family called for me, I got our of the shower and just started having a panic attack so my family wanted EMS to come\". Pt reports that she has been taking her Paxil but has not had access to her klonopin as it is at her father's house.    Patient denies shob and chest pain

## 2019-10-12 NOTE — ED NOTES
I have reviewed discharge instructions with the patient. The patient verbalized understanding. Patient left ED via Discharge Method: ambulatory to Home with self    Opportunity for questions and clarification provided. Patient given 0 scripts. To continue your aftercare when you leave the hospital, you may receive an automated call from our care team to check in on how you are doing. This is a free service and part of our promise to provide the best care and service to meet your aftercare needs.  If you have questions, or wish to unsubscribe from this service please call [phone]. Thank you for Choosing our [facility] Emergency Department.

## 2019-10-12 NOTE — ED PROVIDER NOTES
Archana Marroquin is a 51 y.o. female who presents to the ED who came in after some marijauna use that she takes for her PTSD. She went over to family members to relax and this evening she developed severe anxiety, panic attack, and feelings that she was going to die. She states her symptoms have improved now. She no longer has feelings of impending doom does still have some anxiety from multiple stressful events over the last several years. He denies any suicidal thoughts wants to live. She has not taking any other drugs or alcohol.              Past Medical History:   Diagnosis Date    Anemia     [facility]     Ankylosing spondylitis of lumbar region ([facility] 8/20/2019    Anxiety     Arthritis     Cancer ([facility] 2012    cervix    Chronic pain     Depression     GERD (gastroesophageal reflux disease)     History of cervical cancer in adulthood 11/20/2012    Hypothyroid     IBS (irritable bowel syndrome)     NEHA (iron deficiency anemia) 8/20/2019    Idiopathic scoliosis of lumbar spine 11/10/2016    Lower abdominal pain 5/6/2019    Lung nodules     Memory loss     longterm memory loss    Nervous breakdown     OCD (obsessive compulsive disorder)     Psychiatric disorder     Psychotic disorder ([address]     ptsd    PTSD (post-traumatic stress disorder)     Recurrent depression ([facility] 10/12/2016    Rupture of anterior cruciate ligament of right knee 5/6/2019    S/P gastric bypass 11/10/2016       Past Surgical History:   Procedure Laterality Date    HX BREAST REDUCTION      HX CHOLECYSTECTOMY      HX COLONOSCOPY      HX GASTRIC BYPASS  2013    HX GI      bladder lift    HX GYN      ovarian cyst removal.     HX ORTHOPAEDIC Right     knne, repaired ACL and Menicus    HX OTHER SURGICAL  2015    tummy tuck    HX TASHA AND BSO  2012    with lymp nodes    HX TONSILLECTOMY           Family History:   Problem Relation Age of Onset    Lung Disease Mother         copd, emphysema    Hypertension Father     Arthritis-osteo Father        Social History     Socioeconomic History    Marital status:      Spouse name: Not on file    Number of children: Not on file    Years of education: Not on file    Highest education level: Not on file   Occupational History    Not on file   Social Needs    Financial resource strain: Not on file    Food insecurity:     Worry: Not on file     Inability: Not on file    Transportation needs:     Medical: Not on file     Non-medical: Not on file   Tobacco Use    Smoking status: Former Smoker     Packs/day: 1.00     Years: 2.00     Pack years: 2.00    Smokeless tobacco: Never Used    Tobacco comment: vapes   Substance and Sexual Activity    Alcohol use: No    Drug use: No    Sexual activity: Not on file   Lifestyle    Physical activity:     Days per week: Not on file     Minutes per session: Not on file    Stress: Not on file   Relationships    Social connections:     Talks on phone: Not on file     Gets together: Not on file     Attends Protestant service: Not on file     Active member of club or organization: Not on file     Attends meetings of clubs or organizations: Not on file     Relationship status: Not on file    Intimate partner violence:     Fear of current or ex partner: Not on file     Emotionally abused: Not on file     Physically abused: Not on file     Forced sexual activity: Not on file   Other Topics Concern    Not on file   Social History Narrative    Not on file         ALLERGIES: Augmentin [amoxicillin-pot clavulanate]; Sulfa (sulfonamide antibiotics); Codeine; Lortab [hydrocodone-acetaminophen]; and Pcn [penicillins]    Review of Systems   Constitutional: Negative for chills and fever. Respiratory: Negative for chest tightness and shortness of breath. Cardiovascular: Negative for chest pain and palpitations. Gastrointestinal: Negative for abdominal pain, diarrhea, nausea and vomiting.    Skin: Negative for color change, pallor, rash and wound. Neurological: Negative for weakness and numbness. Psychiatric/Behavioral: Negative for agitation, behavioral problems, confusion, decreased concentration, dysphoric mood, hallucinations, self-injury, sleep disturbance and suicidal ideas. The patient is nervous/anxious. All other systems reviewed and are negative. Vitals:    10/11/19 2331   BP: 144/88   Pulse: 78   Resp: 20   Temp: 98.6 °F (37 °C)   SpO2: 99%   Weight: 81 kg (178 lb 10.1 oz)   Height: 5' 7\" (1.702 m)            Physical Exam   Constitutional: She appears well-developed and well-nourished. No distress. HENT:   Head: Normocephalic and atraumatic. Eyes: Conjunctivae are normal. No scleral icterus. Neck: Normal range of motion. Pulmonary/Chest: Effort normal. No stridor. No respiratory distress. She has no wheezes. She has no rales. Neurological: She is alert. Skin: She is not diaphoretic. Psychiatric: Thought content normal. Her mood appears anxious. Her affect is labile. Her speech is rapid and/or pressured. Her speech is not delayed, not tangential and not slurred. She is not actively hallucinating. Thought content is not paranoid and not delusional. She expresses no homicidal and no suicidal ideation. She expresses no suicidal plans and no homicidal plans. She is communicative. She is attentive. Nursing note and vitals reviewed. MDM  Number of Diagnoses or Management Options  Diagnosis management comments: Patient is not suicidal she does have a psychiatrist she sees. She did not have any benzodiazepines with her during the severe panic attack. I am going to give her some Ativan now try to help her symptoms however she is already much improved and will follow back up with her psychiatrist for definitive management. We discussed coping strategies she could use. Anila Owens MD; 10/12/2019 @12:50 AM Voice dictation software was used during the making of this note.   This software is not perfect and grammatical and other typographical errors may be present.   This note has not been proofread for errors.  ===================================================================            Procedures
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
## 2019-10-13 PROCEDURE — 74011250637 HC RX REV CODE- 250/637: Performed by: EMERGENCY MEDICINE

## 2019-10-13 RX ORDER — ACETAMINOPHEN 500 MG
1000 TABLET ORAL
Status: DISCONTINUED | OUTPATIENT
Start: 2019-10-13 | End: 2019-10-14 | Stop reason: HOSPADM

## 2019-10-13 RX ORDER — LORAZEPAM 1 MG/1
1 TABLET ORAL
Status: COMPLETED | OUTPATIENT
Start: 2019-10-13 | End: 2019-10-13

## 2019-10-13 RX ADMIN — ACETAMINOPHEN 1000 MG: 500 TABLET, FILM COATED ORAL at 16:27

## 2019-10-13 RX ADMIN — ACETAMINOPHEN 1000 MG: 500 TABLET, FILM COATED ORAL at 22:15

## 2019-10-13 RX ADMIN — LORAZEPAM 1 MG: 1 TABLET ORAL at 22:15

## 2019-10-13 RX ADMIN — LORAZEPAM 1 MG: 1 TABLET ORAL at 02:40

## 2019-10-13 RX ADMIN — ACETAMINOPHEN 1000 MG: 500 TABLET, FILM COATED ORAL at 09:05

## 2019-10-13 NOTE — ED NOTES
Pt requested to lay on the floor for comfort, she stated she felt better for her back. Pt was given two blankets to lay on the floor .

## 2019-10-13 NOTE — ED NOTES
Pt resting in bed, respirations even and unlabored. Sitter at bedside. Pt denies any additional needs at this time.

## 2019-10-13 NOTE — ED NOTES
[facility] Psychiatric RECHECK NOTE for 10/13/2019  Arrival Date/Time: 10/12/2019  5:50 PM      Sanjay Abreu  MRN: [id number]    YOB: 1970   51 y.o. female    [facility] EMERGENCY DEPT ER16/16  Seen on 10/13/2019 @ 8:52 AM       Sanjay Abreu is a 51 y.o. female here for psychosis and behavioral disturbance. ---she is on papers. ---she has completed a tele-psych evaluation. Subjective: 56-year-old female with a history of PTSD presenting to the emergency department after some behavioral disturbance and what sounds like acute psychosis. She was found to be unstable for self-care at home. She states this morning that she is feeling improved. She is very polite, cooperative, she seems to have very good insight into her medical history. It sounds like she had been noncompliant with her home psychiatric medications because she has been too busy as a primary caregiver for her father. She denies any hallucinations, no thoughts of suicide at this time. She last received Zyprexa at 6:00 yesterday evening.     Objective:  Vitals:    10/12/19 1757 10/12/19 1921 10/13/19 0746   BP: (!) 156/96 (!) 139/93 (!) 154/96   Pulse: (!) 58 96 80   Resp: 16 18 16   Temp: 99.1 °F (37.3 °C) 98.4 °F (36.9 °C) 97.9 °F (36.6 °C)   SpO2: 100% 100% 95%   Regular rate and rhythm no murmurs rubs or gallops lungs are clear to auscultation bilaterally without wheezes rales rhonchi abdomen is soft nontender nondistended bowel sounds positive, alert and oriented x3, no focal neurological deficits, slightly depressed affect,    Recent Labs:   Recent Labs     10/12/19  1808      K 3.2*   *   CO2 26   BUN 12   CREA 0.95   CA 9.0   *      No lab exists for component: UDS,  BAL    Medications Administered     LORazepam (ATIVAN) tablet 1 mg     Admin Date  10/12/2019 Action  Given Dose  1 mg Route  Oral Administered By  Darylene Ferry, RN           Admin Date  10/13/2019 Action  Given Dose  1 mg Route  Oral Administered By  Pepe Orellana, RN          nicotine (NICODERM CQ) 7 mg/24 hr patch 1 Patch     Admin Date  10/12/2019 Action  Apply Patch Dose  1 Patch Route  TransDERmal Administered By  Chuck Morocho RN                 Assessment: Continue to hold the patient here in the emergency department. If she continues to feel improved tomorrow she may be appropriate for discharge at that time. Plan: Hold in the emergency department for psychiatric care.      Matilde Bonilla MD, ===========================================

## 2019-10-14 VITALS
SYSTOLIC BLOOD PRESSURE: 150 MMHG | TEMPERATURE: 98.6 F | DIASTOLIC BLOOD PRESSURE: 90 MMHG | HEART RATE: 60 BPM | RESPIRATION RATE: 16 BRPM | OXYGEN SATURATION: 99 %
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
## 2019-10-14 NOTE — ED NOTES
TRANSFER - OUT REPORT:    Verbal report given to MALIKA Redman l(name) on Olimpia Borrego  being transferred to unit 5(unit) for routine progression of care       Report consisted of patients Situation, Background, Assessment and   Recommendations(SBAR). Information from the following report(s) ED Summary was reviewed with the receiving nurse. Lines:   Peripheral IV 10/12/19 Left Antecubital (Active)   Site Assessment Clean, dry, & intact 10/12/2019  6:02 PM   Phlebitis Assessment 0 10/12/2019  6:02 PM   Infiltration Assessment 0 10/12/2019  6:02 PM   Dressing Status Clean, dry, & intact 10/12/2019  6:02 PM   Dressing Type Transparent 10/12/2019  6:02 PM   Hub Color/Line Status Green 10/12/2019  6:02 PM        Opportunity for questions and clarification was provided.       Patient transported with:   [place] police, patient belongings

## 2019-10-14 NOTE — PROGRESS NOTES
Nikole (admissions) at [facility] states Dr. Darlene Araujo accepted patient to Unit 5. Patient, family, nurse and MD aware.

## 2019-10-14 NOTE — ED NOTES
[facility] ED Psychiatric RECHECK NOTE for 10/14/2019 Arrival Date/Time: 10/12/2019  5:50 PM   
 
Manuel Ibrahim  MRN: [id number] YOB: 1970   51 y.o. female [facility] EMERGENCY DEPT ER14/14  Seen on 10/14/2019 @ 9:44 AM   
  
Manuel Ibrahim is a 51 y.o. female here for agitation, psychosis. ---she is on papers. They  on 10/15/2019 @8pm 
---she has completed a tele-psych evaluation. Subjective: 50-year-old female presents to the emergency department with agitation and psychosis. Patient states that she was exhausted. The been trying to take care of her father other family members. Not taking her medications. She went to her brother's house and states that \"she lost it She states her son  in a car accident. Both her grandmothers . Her  shot himself in the head. All these prior traumas built up on her. Objective: 
Vitals:  
 10/12/19 1921 10/13/19 5382 10/13/19 2215 10/14/19 1537 BP: (!) 139/93 (!) 154/96 145/89 (!) 156/93 Pulse: 96 80 82 (!) 56 Resp: 18 16 16 16 Temp: 98.4 °F (36.9 °C) 97.9 °F (36.6 °C) 97.9 °F (36.6 °C) 98.6 °F (37 °C) SpO2: 100% 95% 98% 99% Recent Labs:  
Recent Labs 10/12/19 
1808   
K 3.2*  
* CO2 26 BUN 12  
CREA 0.95 CA 9.0  
* No lab exists for component: UDS,  BAL Medications Administered   
 acetaminophen (TYLENOL) tablet 1,000 mg Admin Date 10/13/2019 Action Given Dose 
1000 mg Route Oral Administered By Waldo Mendieta Admin Date 10/13/2019 Action Given Dose 
1000 mg Route Oral Administered By 
[facility], [address] Admin Date 10/13/2019 Action Given Dose 
1000 mg Route Oral Administered By 
Ivan Barkley, RN  
  
  
 LORazepam (ATIVAN) tablet 1 mg Admin Date 10/12/2019 Action Given Dose 
1 mg Route Oral Administered By 
Ivan Barkley RN Admin Date 10/13/2019 Action Given Dose 
1 mg Route Oral Administered By Christopher Hawley RN Admin Date 10/13/2019 Action Given Dose 
1 mg Route Oral Administered By 
Tiki Chahal RN  
  
  
 nicotine (NICODERM CQ) 7 mg/24 hr patch 1 Patch Admin Date 10/12/2019 Action Apply Patch Dose 1 Patch Route TransDERmal Administered By 
Tiki Chahal RN Admin Date 10/13/2019 Action Apply Patch Dose 1 Patch Route TransDERmal Administered By 
Colby Rodriguez RN Assessment: 43-year-old female with history of anxiety. Multiple medical problems. She was not taking her medication. She is feeling much better today. She is awake alert oriented. Denies homicidal or suicidal ideations. States she would like to go home back to her apartment. Start back on her medications. She has psychiatry follow-up with Dr. Geovanni Morgan: I do not believe the patient requires commitment to a psychiatric hospital at this time. She is not expressing any homicidal or suicidal ideations. She is awake alert oriented. She is able to answer questions follow commands We will discharge her home. Follow-up with Dr. Ethan Montoya this week. Return if she worsens anyway or has any problems.  
 
 Esther Morris MD, ===========================================

## 2019-10-14 NOTE — ED NOTES
Patient  Yes - Name: Calhoun   Patient  Oriented YES   High risk patients are in line of sight at all times Yes   Excess equipment/medical supplies not necessary for the care of the patient removed Yes   All sharp or dangerous objects are removed from room: including but not limited to belts, pens & pencils, needles, medications, cosmetics, lighters, matches, nail files, watches, necklaces, glass objects, razors, razor blades, knives, aerosol sprays, drawstring pants, shoes, cords (telephone, call bells, etc.) cleaning wipes or other cleaning items, aluminum cans, not permanently attached wall décor Yes   Telephone/cell phone removed as well as TV remote (batteries can be swallowed) Yes   Patient belongings removed and labeled at nurses station Yes   Excess linen is removed from room Yes   All plastic bags are removed from the room and replaced with paper trash bags Yes   Patient is in gown and using hospital socks with rubber soles Yes   No metal, hard eating utensils or hard plates are on meal tray Yes   Remove all cleaning agents used by Janet's Yes   Ensure bathroom door key is easily accessible Yes   If Crucifix is hanging on a nail, remove Crucifix as well as the nail Yes       *If any question above is answered \"No,\" documentation is required.

## 2019-10-14 NOTE — ED NOTES
Pt is resting with her eyes closed at this time. Pt has equal and unlabored breath raises. Pt in no apparent distress at this time, bed in lowest position, pt within sight of sitter at all time, will continue to monitor.

## 2019-10-14 NOTE — ED NOTES
Pt in no apparent distress at this time, vital signs stable, bed in lowest position, pt within sight of sitter at all time, will continue to monitor.

## 2019-10-14 NOTE — ED NOTES
Patient  Yes - Name: [address] [place] [address]   Patient  Oriented YES   High risk patients are in line of sight at all times Yes   Excess equipment/medical supplies not necessary for the care of the patient removed Yes   All sharp or dangerous objects are removed from room: including but not limited to belts, pens & pencils, needles, medications, cosmetics, lighters, matches, nail files, watches, necklaces, glass objects, razors, razor blades, knives, aerosol sprays, drawstring pants, shoes, cords (telephone, call bells, etc.) cleaning wipes or other cleaning items, aluminum cans, not permanently attached wall décor Yes   Telephone/cell phone removed as well as TV remote (batteries can be swallowed) Yes   Patient belongings removed and labeled at nurses station Yes   Excess linen is removed from room Yes   All plastic bags are removed from the room and replaced with paper trash bags Yes   Patient is in gown and using hospital socks with rubber soles Yes   No metal, hard eating utensils or hard plates are on meal tray Yes   Remove all cleaning agents used by Janet's Yes   Ensure bathroom door key is easily accessible Yes   If Crucifix is hanging on a nail, remove Crucifix as well as the nail Yes       *If any question above is answered \"No,\" documentation is required.

## 2019-12-11 PROBLEM — R10.30 LOWER ABDOMINAL PAIN: Status: RESOLVED | Noted: 2019-05-06 | Resolved: 2019-12-11

## 2019-12-11 PROBLEM — F43.10 PTSD (POST-TRAUMATIC STRESS DISORDER): Status: ACTIVE | Noted: 2019-12-11

## 2021-10-13 PROBLEM — K58.2 IRRITABLE BOWEL SYNDROME WITH BOTH CONSTIPATION AND DIARRHEA: Status: ACTIVE | Noted: 2021-10-13

## 2022-03-18 PROBLEM — M45.6 ANKYLOSING SPONDYLITIS OF LUMBAR REGION (HCC): Status: ACTIVE | Noted: 2019-08-20

## 2022-03-18 PROBLEM — F43.10 PTSD (POST-TRAUMATIC STRESS DISORDER): Status: ACTIVE | Noted: 2019-12-11

## 2022-03-19 PROBLEM — D50.9 IDA (IRON DEFICIENCY ANEMIA): Status: ACTIVE | Noted: 2019-08-20

## 2022-03-19 PROBLEM — K58.2 IRRITABLE BOWEL SYNDROME WITH BOTH CONSTIPATION AND DIARRHEA: Status: ACTIVE | Noted: 2021-10-13

## 2022-03-20 PROBLEM — S83.511A RUPTURE OF ANTERIOR CRUCIATE LIGAMENT OF RIGHT KNEE: Status: ACTIVE | Noted: 2019-05-06

## 2022-07-20 ENCOUNTER — TELEPHONE (OUTPATIENT)
Dept: INTERNAL MEDICINE CLINIC | Facility: CLINIC | Age: 52
End: 2022-07-20

## 2022-07-20 DIAGNOSIS — B00.1 HERPES LABIALIS: Primary | ICD-10-CM

## 2022-07-20 NOTE — TELEPHONE ENCOUNTER
Patient states that she takes Humira and it gives her fever blisters. She is requesting medication to help with this. Please advise.

## 2022-07-21 RX ORDER — VALACYCLOVIR HYDROCHLORIDE 1 G/1
TABLET, FILM COATED ORAL
Qty: 12 TABLET | Refills: 1 | Status: SHIPPED | OUTPATIENT
Start: 2022-07-21

## 2022-09-02 ENCOUNTER — NURSE TRIAGE (OUTPATIENT)
Dept: OTHER | Facility: CLINIC | Age: 52
End: 2022-09-02

## 2022-09-02 NOTE — TELEPHONE ENCOUNTER
Received call from [facility] at [facility] with Red Flag Complaint. Subjective: Caller states \"has abdominal pain and swelling. Also has fatigue, mid back pain and a little bit of nausea. Getting short of breath with exertion. \"     Current Symptoms:     Onset: 1 day ago; unchanged    Associated Symptoms: NA    Pain Severity:  back pain yesterday 9/10; sharp; intermittent    Temperature: unsure     What has been tried: Advil, salonpas patch    LMP: NA Pregnant: NA    Recommended disposition: Go to [facility]    Care advice provided, patient verbalizes understanding; denies any other questions or concerns; instructed to call back for any new or worsening symptoms. Patient/Caller agrees with recommended disposition; writer provided warm transfer to "[employer] at [facility] for appointment scheduling     Attention Provider: Thank you for allowing me to participate in the care of your patient. The patient was connected to triage in response to information provided to the ECC/PSC. Please do not respond through this encounter as the response is not directed to a shared pool.       Reason for Disposition   High-risk adult (e.g., history of cancer, history of HIV, or history of IV Drug Use)   MILD difficulty breathing (e.g., minimal/no SOB at rest, SOB with walking, pulse < 100) of new-onset or worse than normal    Protocols used: Back Pain-ADULT-[place], Breathing Difficulty-ADULT-[place]

## 2022-09-03 ENCOUNTER — HOSPITAL ENCOUNTER (EMERGENCY)
Dept: CT IMAGING | Age: 52
Discharge: HOME | End: 2022-09-06
Payer: MEDICARE

## 2022-09-03 ENCOUNTER — HOSPITAL ENCOUNTER (EMERGENCY)
Dept: GENERAL RADIOLOGY | Age: 52
Discharge: HOME | End: 2022-09-06
Payer: MEDICARE

## 2022-09-03 ENCOUNTER — HOSPITAL ENCOUNTER (EMERGENCY)
Age: 52
Discharge: HOME | End: 2022-09-03
Attending: STUDENT IN AN ORGANIZED HEALTH CARE EDUCATION/TRAINING PROGRAM
Payer: MEDICARE

## 2022-09-03 VITALS
HEART RATE: 57 BPM | RESPIRATION RATE: 23 BRPM | TEMPERATURE: 98.7 F | SYSTOLIC BLOOD PRESSURE: 147 MMHG | HEIGHT: 67 IN | DIASTOLIC BLOOD PRESSURE: 81 MMHG | BODY MASS INDEX: 24.96 KG/M2 | OXYGEN SATURATION: 95 % | WEIGHT: 159 LBS

## 2022-09-03 DIAGNOSIS — K59.00 CONSTIPATION, UNSPECIFIED CONSTIPATION TYPE: Primary | ICD-10-CM

## 2022-09-03 LAB
ALBUMIN SERPL-MCNC: 3.4 G/DL (ref 3.5–5)
ALBUMIN/GLOB SERPL: 1.4 {RATIO} (ref 1.2–3.5)
ALP SERPL-CCNC: 81 U/L (ref 50–136)
ALT SERPL-CCNC: 14 U/L (ref 12–65)
ANION GAP SERPL CALC-SCNC: 4 MMOL/L (ref 4–13)
APPEARANCE UR: CLEAR
AST SERPL-CCNC: 14 U/L (ref 15–37)
BACTERIA URNS QL MICRO: NEGATIVE /HPF
BASOPHILS # BLD: 0.1 K/UL (ref 0–0.2)
BASOPHILS NFR BLD: 1 % (ref 0–2)
BILIRUB SERPL-MCNC: 0.3 MG/DL (ref 0.2–1.1)
BILIRUB UR QL: NEGATIVE
BUN SERPL-MCNC: 16 MG/DL (ref 6–23)
CALCIUM SERPL-MCNC: 8.5 MG/DL (ref 8.3–10.4)
CASTS URNS QL MICRO: ABNORMAL /LPF
CHLORIDE SERPL-SCNC: 108 MMOL/L (ref 101–110)
CO2 SERPL-SCNC: 26 MMOL/L (ref 21–32)
COLOR UR: ABNORMAL
CREAT SERPL-MCNC: 0.72 MG/DL (ref 0.6–1)
DIFFERENTIAL METHOD BLD: ABNORMAL
EOSINOPHIL # BLD: 0.3 K/UL (ref 0–0.8)
EOSINOPHIL NFR BLD: 3 % (ref 0.5–7.8)
EPI CELLS #/AREA URNS HPF: ABNORMAL /HPF
ERYTHROCYTE [DISTWIDTH] IN BLOOD BY AUTOMATED COUNT: 12.7 % (ref 11.9–14.6)
GLOBULIN SER CALC-MCNC: 2.4 G/DL (ref 2.3–3.5)
GLUCOSE SERPL-MCNC: 80 MG/DL (ref 65–100)
GLUCOSE UR STRIP.AUTO-MCNC: NEGATIVE MG/DL
HCT VFR BLD AUTO: 33.5 % (ref 35.8–46.3)
HGB BLD-MCNC: 11.3 G/DL (ref 11.7–15.4)
HGB UR QL STRIP: NEGATIVE
IMM GRANULOCYTES # BLD AUTO: 0 K/UL (ref 0–0.5)
IMM GRANULOCYTES NFR BLD AUTO: 0 % (ref 0–5)
KETONES UR QL STRIP.AUTO: NEGATIVE MG/DL
LEUKOCYTE ESTERASE UR QL STRIP.AUTO: ABNORMAL
LIPASE SERPL-CCNC: 112 U/L (ref 73–393)
LYMPHOCYTES # BLD: 3.4 K/UL (ref 0.5–4.6)
LYMPHOCYTES NFR BLD: 47 % (ref 13–44)
MAGNESIUM SERPL-MCNC: 2.1 MG/DL (ref 1.8–2.4)
MCH RBC QN AUTO: 32.2 PG (ref 26.1–32.9)
MCHC RBC AUTO-ENTMCNC: 33.7 G/DL (ref 31.4–35)
MCV RBC AUTO: 95.4 FL (ref 79.6–97.8)
MONOCYTES # BLD: 0.8 K/UL (ref 0.1–1.3)
MONOCYTES NFR BLD: 11 % (ref 4–12)
NEUTS SEG # BLD: 2.7 K/UL (ref 1.7–8.2)
NEUTS SEG NFR BLD: 38 % (ref 43–78)
NITRITE UR QL STRIP.AUTO: NEGATIVE
NRBC # BLD: 0 K/UL (ref 0–0.2)
PH UR STRIP: 5.5 [PH] (ref 5–9)
PLATELET # BLD AUTO: 287 K/UL (ref 150–450)
PMV BLD AUTO: 10 FL (ref 9.4–12.3)
POTASSIUM SERPL-SCNC: 3.7 MMOL/L (ref 3.5–5.1)
PROT SERPL-MCNC: 5.8 G/DL (ref 6.3–8.2)
PROT UR STRIP-MCNC: NEGATIVE MG/DL
RBC # BLD AUTO: 3.51 M/UL (ref 4.05–5.2)
RBC #/AREA URNS HPF: ABNORMAL /HPF
SODIUM SERPL-SCNC: 138 MMOL/L (ref 136–145)
SP GR UR REFRACTOMETRY: 1.01 (ref 1–1.02)
TROPONIN I SERPL HS-MCNC: 5.4 PG/ML (ref 0–14)
TROPONIN I SERPL HS-MCNC: 7.1 PG/ML (ref 0–14)
UROBILINOGEN UR QL STRIP.AUTO: 0.2 EU/DL (ref 0.2–1)
WBC # BLD AUTO: 7.3 K/UL (ref 4.3–11.1)
WBC URNS QL MICRO: ABNORMAL /HPF

## 2022-09-03 PROCEDURE — 96375 TX/PRO/DX INJ NEW DRUG ADDON: CPT

## 2022-09-03 PROCEDURE — 96374 THER/PROPH/DIAG INJ IV PUSH: CPT

## 2022-09-03 PROCEDURE — 2580000003 HC RX 258: Performed by: STUDENT IN AN ORGANIZED HEALTH CARE EDUCATION/TRAINING PROGRAM

## 2022-09-03 PROCEDURE — 85025 COMPLETE CBC W/AUTO DIFF WBC: CPT

## 2022-09-03 PROCEDURE — 81001 URINALYSIS AUTO W/SCOPE: CPT

## 2022-09-03 PROCEDURE — 6360000004 HC RX CONTRAST MEDICATION: Performed by: STUDENT IN AN ORGANIZED HEALTH CARE EDUCATION/TRAINING PROGRAM

## 2022-09-03 PROCEDURE — 83735 ASSAY OF MAGNESIUM: CPT

## 2022-09-03 PROCEDURE — 74177 CT ABD & PELVIS W/CONTRAST: CPT

## 2022-09-03 PROCEDURE — 84484 ASSAY OF TROPONIN QUANT: CPT

## 2022-09-03 PROCEDURE — 80053 COMPREHEN METABOLIC PANEL: CPT

## 2022-09-03 PROCEDURE — 83690 ASSAY OF LIPASE: CPT

## 2022-09-03 PROCEDURE — 99285 EMERGENCY DEPT VISIT HI MDM: CPT

## 2022-09-03 PROCEDURE — 6360000002 HC RX W HCPCS: Performed by: STUDENT IN AN ORGANIZED HEALTH CARE EDUCATION/TRAINING PROGRAM

## 2022-09-03 PROCEDURE — 71046 X-RAY EXAM CHEST 2 VIEWS: CPT

## 2022-09-03 RX ORDER — DOCUSATE SODIUM 100 MG/1
100 CAPSULE, LIQUID FILLED ORAL 3 TIMES DAILY PRN
Qty: 20 CAPSULE | Refills: 0 | Status: SHIPPED | OUTPATIENT
Start: 2022-09-03

## 2022-09-03 RX ORDER — POLYETHYLENE GLYCOL 3350 17 G/17G
17 POWDER, FOR SOLUTION ORAL 2 TIMES DAILY
Qty: 1530 G | Refills: 1 | Status: SHIPPED | OUTPATIENT
Start: 2022-09-03 | End: 2022-10-03

## 2022-09-03 RX ORDER — 0.9 % SODIUM CHLORIDE 0.9 %
100 INTRAVENOUS SOLUTION INTRAVENOUS
Status: COMPLETED | OUTPATIENT
Start: 2022-09-03 | End: 2022-09-03

## 2022-09-03 RX ORDER — HYDROMORPHONE HYDROCHLORIDE 1 MG/ML
1 INJECTION, SOLUTION INTRAMUSCULAR; INTRAVENOUS; SUBCUTANEOUS
Status: COMPLETED | OUTPATIENT
Start: 2022-09-03 | End: 2022-09-03

## 2022-09-03 RX ORDER — MORPHINE SULFATE 4 MG/ML
4 INJECTION INTRAVENOUS ONCE
Status: COMPLETED | OUTPATIENT
Start: 2022-09-03 | End: 2022-09-03

## 2022-09-03 RX ORDER — 0.9 % SODIUM CHLORIDE 0.9 %
1000 INTRAVENOUS SOLUTION INTRAVENOUS
Status: COMPLETED | OUTPATIENT
Start: 2022-09-03 | End: 2022-09-03

## 2022-09-03 RX ORDER — DICYCLOMINE HYDROCHLORIDE 10 MG/1
10 CAPSULE ORAL 4 TIMES DAILY
Qty: 360 CAPSULE | Refills: 1 | Status: SHIPPED | OUTPATIENT
Start: 2022-09-03

## 2022-09-03 RX ORDER — ONDANSETRON 2 MG/ML
4 INJECTION INTRAMUSCULAR; INTRAVENOUS
Status: COMPLETED | OUTPATIENT
Start: 2022-09-03 | End: 2022-09-03

## 2022-09-03 RX ORDER — SODIUM CHLORIDE 0.9 % (FLUSH) 0.9 %
10 SYRINGE (ML) INJECTION
Status: COMPLETED | OUTPATIENT
Start: 2022-09-03 | End: 2022-09-03

## 2022-09-03 RX ADMIN — MORPHINE SULFATE 4 MG: 4 INJECTION INTRAVENOUS at 01:36

## 2022-09-03 RX ADMIN — SODIUM CHLORIDE 100 ML: 9 INJECTION, SOLUTION INTRAVENOUS at 02:08

## 2022-09-03 RX ADMIN — IOHEXOL 100 ML: 350 INJECTION, SOLUTION INTRAVENOUS at 02:08

## 2022-09-03 RX ADMIN — HYDROMORPHONE HYDROCHLORIDE 1 MG: 1 INJECTION, SOLUTION INTRAMUSCULAR; INTRAVENOUS; SUBCUTANEOUS at 03:02

## 2022-09-03 RX ADMIN — SODIUM CHLORIDE, PRESERVATIVE FREE 10 ML: 5 INJECTION INTRAVENOUS at 02:08

## 2022-09-03 RX ADMIN — ONDANSETRON 4 MG: 2 INJECTION INTRAMUSCULAR; INTRAVENOUS at 01:31

## 2022-09-03 RX ADMIN — SODIUM CHLORIDE 1000 ML: 9 INJECTION, SOLUTION INTRAVENOUS at 01:30

## 2022-09-03 ASSESSMENT — PAIN SCALES - GENERAL: PAINLEVEL_OUTOF10: 7

## 2022-09-03 ASSESSMENT — ENCOUNTER SYMPTOMS
EYE ITCHING: 0
SORE THROAT: 0
EYE DISCHARGE: 0
WHEEZING: 0
CHEST TIGHTNESS: 0
SHORTNESS OF BREATH: 1
RHINORRHEA: 0
DIARRHEA: 0
EYE REDNESS: 0
ABDOMINAL DISTENTION: 1
APNEA: 0
COUGH: 0
NAUSEA: 1
COLOR CHANGE: 0
ABDOMINAL PAIN: 1
BACK PAIN: 0
EYE PAIN: 0
ANAL BLEEDING: 0
VOMITING: 1

## 2022-09-03 ASSESSMENT — PAIN - FUNCTIONAL ASSESSMENT: PAIN_FUNCTIONAL_ASSESSMENT: 0-10

## 2022-09-03 NOTE — ED NOTES
I have reviewed discharge instructions with the patient. The patient verbalized understanding. Patient left ED via Discharge Method: ambulatory to Home with self. Opportunity for questions and clarification provided. Patient given 4 scripts. To continue your aftercare when you leave the hospital, you may receive an automated call from our care team to check in on how you are doing. This is a free service and part of our promise to provide the best care and service to meet your aftercare needs.  If you have questions, or wish to unsubscribe from this service please call [phone]. Thank you for Choosing our [facility] Emergency Department.         FRANCINE Gutierres  09/03/22 6239

## 2022-09-03 NOTE — ED PROVIDER NOTES
[employer] Emergency Department Provider Note                   PCP:                Princess Smith DO               Age: 47 y.o. Sex: female     No diagnosis found. DISPOSITION          MDM  Number of Diagnoses or Management Options  Diagnosis management comments: EKG interpretation: Sinus bradycardia, rate of 44, normal axis, no ischemia. Blood pressure stable remainder vitals are normal.  Given patient's extensive surgical history, reports of nausea vomiting and abdominal discomfort with distention, will obtain CT imaging of the abdomen and pelvis. Patient's chest discomfort is described as a \"fullness\" secondary to her abdominal pain. We will rule out for ACS though my suspicion is very low.        Amount and/or Complexity of Data Reviewed  Clinical lab tests: ordered and reviewed  Tests in the radiology section of CPT®: ordered and reviewed  Tests in the medicine section of CPT®: ordered and reviewed  Independent visualization of images, tracings, or specimens: yes    Risk of Complications, Morbidity, and/or Mortality  Presenting problems: moderate  Diagnostic procedures: low  Management options: moderate    Patient Progress  Patient progress: stable       Orders Placed This Encounter   Procedures    XR CHEST (2 VW)    CT ABDOMEN PELVIS W IV CONTRAST Additional Contrast? None    CBC with Auto Differential    Comprehensive Metabolic Panel    Troponin    Urinalysis    Lipase    Magnesium    EKG 12 Lead        Medications   0.9 % sodium chloride bolus (has no administration in time range)   morphine injection 4 mg (has no administration in time range)   ondansetron (ZOFRAN) injection 4 mg (has no administration in time range)       New Prescriptions    No medications on file        Natalia Huizar is a 47 y.o. female who presents to the Emergency Department with chief complaint of    Chief Complaint   Patient presents with    Abdominal Pain      80-year-old female patient presents to this department via EMS with reports of chest discomfort and abdominal pain. Patient states pain started earlier today. She reports intermittent bouts of chest \"fullness\" and states that she is experienced progressive pain generally through the abdomen though most focused over the left upper quadrant. She also reports abdominal distention and nausea with several episodes of vomiting at home. She reports normal bowel movement earlier today and states she continues to urinate without difficulty. She reports no associated fever or chills. She attempted Levsin earlier today without effect. EMS providers transported patient state the patient noted symptom onset after recent Humira injection for ankylosing spondylolysis. Patient reports ongoing abdominal discomfort with pain in the left flank at times. She has an extensive surgical history including total abdominal hysterectomy for cervical cancer, previous gastric bypass and panniculectomy. The history is provided by the patient and the EMS personnel. No  was used. Review of Systems   Constitutional:  Positive for fatigue. Negative for activity change, appetite change, chills and fever. HENT:  Negative for congestion, ear discharge, ear pain, rhinorrhea and sore throat. Eyes:  Negative for pain, discharge, redness, itching and visual disturbance. Respiratory:  Positive for shortness of breath. Negative for apnea, cough, chest tightness and wheezing. Cardiovascular:  Positive for chest pain. Negative for palpitations and leg swelling. Gastrointestinal:  Positive for abdominal distention, abdominal pain, nausea and vomiting. Negative for anal bleeding and diarrhea. Genitourinary:  Negative for difficulty urinating, dysuria, flank pain, frequency, hematuria, pelvic pain, vaginal bleeding and vaginal discharge. Musculoskeletal:  Negative for arthralgias, back pain, myalgias, neck pain and neck stiffness.    Skin:  Negative for color change, pallor, rash and wound. Neurological:  Negative for dizziness, tremors, facial asymmetry, weakness, light-headedness, numbness and headaches. Psychiatric/Behavioral:  Negative for agitation, behavioral problems, confusion, self-injury and suicidal ideas. The patient is not nervous/anxious. All other systems reviewed and are negative.     Past Medical History:   Diagnosis Date    Anemia     Ankylosing spondylitis (HCC)     Ankylosing spondylitis of lumbar region ([facility] [address] 8/20/2019    Anxiety     Arthritis     Cancer ([facility] [address] 2012    cervix    Chronic pain     Depression     GERD (gastroesophageal reflux disease)     History of cervical cancer in adulthood 11/20/2012    History of hypothyroidism 11/1/2012    Hypothyroid     IBS (irritable bowel syndrome)     ROCAEL (iron deficiency anemia) 8/20/2019    Idiopathic scoliosis of lumbar spine 11/10/2016    Irritable bowel syndrome with both constipation and diarrhea 10/13/2021    Lower abdominal pain 5/6/2019    Lung nodules     Memory loss     longterm memory loss    Nervous breakdown     OCD (obsessive compulsive disorder)     Psychiatric disorder     Psychotic disorder ([facility] [address]     ptsd    PTSD (post-traumatic stress disorder) 12/11/2019    PTSD (post-traumatic stress disorder)     Recurrent depression ([facility] [address] 10/12/2016    Rupture of anterior cruciate ligament of right knee 5/6/2019    S/P gastric bypass 11/10/2016        Past Surgical History:   Procedure Laterality Date    BREAST REDUCTION SURGERY      CHOLECYSTECTOMY      COLONOSCOPY      GASTRIC BYPASS SURGERY  2013    GI      bladder lift    GYN      ovarian cyst removal.     ORTHOPEDIC SURGERY Right     knne, repaired ACL and Menicus    OTHER SURGICAL HISTORY  2015    tummy tuck    KUSHAL AND BSO (CERVIX REMOVED)  2012    with lymp nodes    TONSILLECTOMY          Family History   Problem Relation Age of Onset    Hypertension Father     Lung Disease Mother         copd, emphysema    Osteoarthritis Father         Social History     Socioeconomic History    Marital status:      Spouse name: None    Number of children: None    Years of education: None    Highest education level: None   Tobacco Use    Smoking status: Every Day     Packs/day: 1.00     Types: Cigarettes, E-Cigarettes    Smokeless tobacco: Never    Tobacco comments:     Quit smoking: vapes   Substance and Sexual Activity    Alcohol use: No    Drug use: Not Currently     Types: Marijuana Gallo Mustard)         Amoxicillin-pot clavulanate, Sulfa antibiotics, Hydrocodone-acetaminophen, Penicillins, and Codeine     Previous Medications    ALBUTEROL SULFATE  (90 BASE) MCG/ACT INHALER    Inhale 1 puff into the lungs every 6 hours as needed    AMPHETAMINE-DEXTROAMPHETAMINE (ADDERALL) 10 MG TABLET    Take 10 mg by mouth daily. CLONAZEPAM (KLONOPIN) 2 MG TABLET    Take 1 mg by mouth as needed. FEXOFENADINE (ALLEGRA) 180 MG TABLET    Take by mouth    GABAPENTIN (NEURONTIN) 300 MG CAPSULE    Take 300 mg by mouth 3 times daily (before meals). HYOSCYAMINE SULFATE SL 0.125 MG SUBL    Place 0.125 mg under the tongue every 4 hours as needed    OLMESARTAN (BENICAR) 40 MG TABLET    Take 40 mg by mouth daily    ONDANSETRON (ZOFRAN-ODT) 4 MG DISINTEGRATING TABLET    Take 4 mg by mouth every 8 hours as needed    PAROXETINE (PAXIL) 40 MG TABLET    Take 40 mg by mouth daily    POTASSIUM GLUCONATE 550 MG TABLET    Take 99 mg by mouth daily    PROPRANOLOL (INDERAL) 40 MG TABLET    Take 40 mg by mouth daily    QUETIAPINE (SEROQUEL) 200 MG TABLET    Take 200 mg by mouth    VALACYCLOVIR (VALTREX) 1 G TABLET    2g every 12 hours x 1 day Indications: a cold sore        Vitals signs and nursing note reviewed. Patient Vitals for the past 4 hrs:   Temp Pulse Resp BP SpO2   09/03/22 0112 98.7 °F (37.1 °C) 50 19 126/66 99 %          Physical Exam  Vitals and nursing note reviewed. Constitutional:       General: She is not in acute distress. Appearance: Normal appearance.  She is normal weight. She is not ill-appearing or toxic-appearing. Comments: Generally well-appearing, alert and oriented x4. No acute distress, speaks in clear, fluid sentences. HENT:      Head: Normocephalic and atraumatic. Right Ear: External ear normal.      Left Ear: External ear normal.      Nose: Nose normal.      Mouth/Throat:      Mouth: Mucous membranes are moist.   Eyes:      General: No scleral icterus. Right eye: No discharge. Left eye: No discharge. Extraocular Movements: Extraocular movements intact. Cardiovascular:      Rate and Rhythm: Normal rate and regular rhythm. Pulses: Normal pulses. Heart sounds: Normal heart sounds. Pulmonary:      Effort: Pulmonary effort is normal. No tachypnea, bradypnea, accessory muscle usage, prolonged expiration or respiratory distress. Breath sounds: Normal breath sounds and air entry. No stridor. No decreased breath sounds, wheezing, rhonchi or rales. Abdominal:      General: Abdomen is flat. Bowel sounds are increased. There is no distension. Palpations: There is no mass. Tenderness: There is generalized abdominal tenderness and tenderness in the epigastric area and left upper quadrant. There is no right CVA tenderness, left CVA tenderness, guarding or rebound. Negative signs include Rose's sign and McBurney's sign. Hernia: No hernia is present. Comments: The abdomen is generally tender, most pronounced over the left upper quadrant or epigastric region. Negative Rose sign, no pain at McBurney's point. Bowel sounds are slightly increased diffusely. Musculoskeletal:         General: No swelling, tenderness or deformity. Normal range of motion. Cervical back: Normal range of motion. Skin:     General: Skin is warm. Capillary Refill: Capillary refill takes less than 2 seconds. Neurological:      General: No focal deficit present. Mental Status: She is alert.    Psychiatric: Mood and Affect: Mood normal.        Procedures      Results Include:    No results found for this or any previous visit (from the past 24 hour(s)). XR CHEST (2 VW)    (Results Pending)   CT ABDOMEN PELVIS W IV CONTRAST Additional Contrast? None    (Results Pending)                          Voice dictation software was used during the making of this note. This software is not perfect and grammatical and other typographical errors may be present. This note has not been completely proofread for errors.        Meli Tovar DO  09/03/22 0124

## 2022-09-03 NOTE — DISCHARGE INSTRUCTIONS
Begin with magnesium citrate solution. Drink half of the bottle, wait 30 minutes and then drink the remaining fluid if you have no results. You may repeat this process once. In addition, begin taking MiraLAX daily along with Colace. As you begin to have bowel movements discontinue use of MiraLAX. Continue Colace as directed until stools normalize and symptoms have improved. Use your Zofran as needed for nausea. Drink plenty of clear liquids to ensure hydration, exercise regularly and increase fiber intake in your diet.   Arrange follow-up with your primary care provider in 1 week for recheck

## 2022-09-21 RX ORDER — OLMESARTAN MEDOXOMIL 40 MG/1
TABLET ORAL
Qty: 90 TABLET | Refills: 0 | OUTPATIENT
Start: 2022-09-21

## 2022-09-25 RX ORDER — OLMESARTAN MEDOXOMIL 40 MG/1
TABLET ORAL
Qty: 90 TABLET | Refills: 0 | Status: SHIPPED | OUTPATIENT
Start: 2022-09-25

## 2022-10-24 ENCOUNTER — OFFICE VISIT (OUTPATIENT)
Dept: INTERNAL MEDICINE CLINIC | Facility: CLINIC | Age: 52
End: 2022-10-24
Payer: MEDICARE

## 2022-10-24 VITALS
DIASTOLIC BLOOD PRESSURE: 78 MMHG | HEART RATE: 64 BPM | SYSTOLIC BLOOD PRESSURE: 138 MMHG | OXYGEN SATURATION: 98 % | WEIGHT: 168 LBS | RESPIRATION RATE: 17 BRPM | HEIGHT: 67 IN | BODY MASS INDEX: 26.37 KG/M2

## 2022-10-24 DIAGNOSIS — F33.9 RECURRENT DEPRESSION (HCC): ICD-10-CM

## 2022-10-24 DIAGNOSIS — E78.2 MIXED HYPERLIPIDEMIA: ICD-10-CM

## 2022-10-24 DIAGNOSIS — Z86.39 HISTORY OF HYPOTHYROIDISM: ICD-10-CM

## 2022-10-24 DIAGNOSIS — S40.862D INSECT BITE OF LEFT UPPER ARM, SUBSEQUENT ENCOUNTER: Primary | ICD-10-CM
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
## 2022-10-24 PROCEDURE — 99214 OFFICE O/P EST MOD 30 MIN: CPT | Performed by: FAMILY MEDICINE

## 2022-10-24 PROCEDURE — 3017F COLORECTAL CA SCREEN DOC REV: CPT | Performed by: FAMILY MEDICINE

## 2022-10-24 PROCEDURE — G8484 FLU IMMUNIZE NO ADMIN: HCPCS | Performed by: FAMILY MEDICINE

## 2022-10-24 PROCEDURE — G8427 DOCREV CUR MEDS BY ELIG CLIN: HCPCS | Performed by: FAMILY MEDICINE

## 2022-10-24 PROCEDURE — G8419 CALC BMI OUT NRM PARAM NOF/U: HCPCS | Performed by: FAMILY MEDICINE

## 2022-10-24 PROCEDURE — 1036F TOBACCO NON-USER: CPT | Performed by: FAMILY MEDICINE

## 2022-10-24 RX ORDER — DOXYCYCLINE HYCLATE 100 MG/1
100 CAPSULE ORAL 2 TIMES DAILY
COMMUNITY

## 2022-10-24 RX ORDER — METHYLPREDNISOLONE 4 MG/1
4 TABLET ORAL SEE ADMIN INSTRUCTIONS
COMMUNITY

## 2022-10-24 RX ORDER — TRIAMCINOLONE ACETONIDE 1 MG/G
CREAM TOPICAL
Qty: 45 G | Refills: 1 | Status: SHIPPED | OUTPATIENT
Start: 2022-10-24

## 2022-10-24 ASSESSMENT — PATIENT HEALTH QUESTIONNAIRE - PHQ9
2. FEELING DOWN, DEPRESSED OR HOPELESS: 0
4. FEELING TIRED OR HAVING LITTLE ENERGY: 0
8. MOVING OR SPEAKING SO SLOWLY THAT OTHER PEOPLE COULD HAVE NOTICED. OR THE OPPOSITE, BEING SO FIGETY OR RESTLESS THAT YOU HAVE BEEN MOVING AROUND A LOT MORE THAN USUAL: 0
7. TROUBLE CONCENTRATING ON THINGS, SUCH AS READING THE NEWSPAPER OR WATCHING TELEVISION: 0
9. THOUGHTS THAT YOU WOULD BE BETTER OFF DEAD, OR OF HURTING YOURSELF: 0
5. POOR APPETITE OR OVEREATING: 0
1. LITTLE INTEREST OR PLEASURE IN DOING THINGS: 0
10. IF YOU CHECKED OFF ANY PROBLEMS, HOW DIFFICULT HAVE THESE PROBLEMS MADE IT FOR YOU TO DO YOUR WORK, TAKE CARE OF THINGS AT HOME, OR GET ALONG WITH OTHER PEOPLE: 0
3. TROUBLE FALLING OR STAYING ASLEEP: 0
SUM OF ALL RESPONSES TO PHQ QUESTIONS 1-9: 0
6. FEELING BAD ABOUT YOURSELF - OR THAT YOU ARE A FAILURE OR HAVE LET YOURSELF OR YOUR FAMILY DOWN: 0
SUM OF ALL RESPONSES TO PHQ QUESTIONS 1-9: 0
SUM OF ALL RESPONSES TO PHQ9 QUESTIONS 1 & 2: 0

## 2022-10-25 LAB
CHOLEST SERPL-MCNC: 215 MG/DL
HDLC SERPL-MCNC: 87 MG/DL (ref 40–60)
HDLC SERPL: 2.5 {RATIO}
LDLC SERPL CALC-MCNC: 107.4 MG/DL
TRIGL SERPL-MCNC: 103 MG/DL (ref 35–150)
TSH W FREE THYROID IF ABNORMAL: 0.9 UIU/ML (ref 0.36–3.74)
VLDLC SERPL CALC-MCNC: 20.6 MG/DL (ref 6–23)

## 2022-10-25 NOTE — PROGRESS NOTES
Marcia Libman, DO  Diplomate of the [employer] of [address] [place] [address] Internal Medicine      Aaliyah Davila (: 1970) is a 47 y.o. female, here for evaluation of the following chief complaint(s):  Insect Bite       ASSESSMENT/PLAN:  1. Insect bite of left upper arm, subsequent encounter  -     triamcinolone (KENALOG) 0.1 % cream; Apply topically 2 times daily. , Disp-45 g, R-1, Normal  2. Mixed hyperlipidemia  -     Lipid Panel; Future  3. History of hypothyroidism  -     TSH with Reflex; Future  4. Recurrent depression ([facility]       -Continue doxy to completion.  -topical triamcinolone cream for pruritis. -Recheck lipids.  -check TSH and treat if indicated. -Tolerating medication well without adverse effects and providing good therapeutic benefit for depression/ptsd. Will continue with paxil/seroquel and advised to let us know for any worsening symptoms. SUBJECTIVE/OBJECTIVE:  HPI:  -was bitten by insect/spider several days ago affecting L antecubital/upper arm region. Was given 10d doxycycline/medrol dosepack. Starting to improve, but having a lot of itchiness and erythema associated. No purulent drainage.  -due for follow up on lipids. -hx of hypothyroidism, but has been euthyroid for a while. She  denies fatigue, weight changes, heat/cold intolerance, bowel/skin changes or CVS symptoms. -depression/PTSD stable. Feels moods stable. ROS negative except as noted above today. Social History     Tobacco Use    Smoking status: Never    Smokeless tobacco: Never    Tobacco comments:     Quit smoking: vapes   Substance Use Topics    Alcohol use: No    Drug use: Not Currently     Types: Marijuana (Weed)     Vitals:    10/24/22 1121   BP: 138/78   Site: Left Upper Arm   Position: Sitting   Pulse: 64   Resp: 17   SpO2: 98%   Weight: 168 lb (76.2 kg)   Height: 5' 7\" (1.702 m)      Body mass index is 26.31 kg/m². Physical Exam  Vitals reviewed.    Cardiovascular:      Rate and Rhythm: Normal rate and regular rhythm. Pulmonary:      Effort: Pulmonary effort is normal.      Breath sounds: Normal breath sounds. Skin:     General: Skin is warm and dry. Comments: -erythematous patch noted left antecubital fossa extending to base of biceps region. No purulence or fluctuance. Neurological:      Mental Status: She is alert. An electronic signature was used to authenticate this note.   Irma Molina, DO

## 2022-11-25 ENCOUNTER — PATIENT MESSAGE (OUTPATIENT)
Dept: INTERNAL MEDICINE CLINIC | Facility: CLINIC | Age: 52
End: 2022-11-25

## 2022-11-25 DIAGNOSIS — B37.31 YEAST VAGINITIS: Primary | ICD-10-CM

## 2022-11-25 RX ORDER — FLUCONAZOLE 150 MG/1
150 TABLET ORAL ONCE
Qty: 1 TABLET | Refills: 0 | Status: CANCELLED | OUTPATIENT
Start: 2022-11-25 | End: 2022-11-25

## 2022-11-25 RX ORDER — VALACYCLOVIR HYDROCHLORIDE 1 G/1
1000 TABLET, FILM COATED ORAL 2 TIMES DAILY
Status: CANCELLED | OUTPATIENT
Start: 2022-11-25

## 2022-11-25 NOTE — TELEPHONE ENCOUNTER
From: Maria Luisa Prakash  To: Dr. Shahida Da Silva Brothers  Sent: 11/25/2022 11:26 AM EST  Subject: Maria Luisa Prakash [birth year]. [phone]    Happy Thanksgiving, Praying you got in some good quality family time. [address] [address] is there anyway that you could call me in some Diflucan, where I was on those antibiotics a couple weeks ago. It Absolutely caught up with me while I was on them for the spider bite. Also can you call me in some Zovirax as well so Ill have that on hand for the winter. While Im requesting the Pharm. Please give me a refill on the prednisone, the stronger one. Now Maybe a day trip (hum 30 days / [facility] [place]would do me good. :) If on sale put me in for 1 ASAP!! Seriously I apologize but I thought may as well bomb all @ once, considering the yeast infection. Lol. [address] at . [place], [address] #[phone].

## 2022-11-27 RX ORDER — FLUCONAZOLE 150 MG/1
TABLET ORAL
Qty: 2 TABLET | Refills: 0 | Status: SHIPPED | OUTPATIENT
Start: 2022-11-27

## 2022-11-27 NOTE — TELEPHONE ENCOUNTER
-I sent out diflucan.  -what does she need acyclovir for?  -She will need to have her Rheumatologist Rx steroids if they think appropriate.

## 2022-12-02 ENCOUNTER — PATIENT MESSAGE (OUTPATIENT)
Dept: INTERNAL MEDICINE CLINIC | Facility: CLINIC | Age: 52
End: 2022-12-02

## 2022-12-02 RX ORDER — ONDANSETRON 4 MG/1
4 TABLET, ORALLY DISINTEGRATING ORAL EVERY 8 HOURS PRN
Qty: 30 TABLET | Refills: 2 | Status: SHIPPED | OUTPATIENT
Start: 2022-12-02

## 2022-12-02 NOTE — TELEPHONE ENCOUNTER
From: Brenden Morales  To: Dr. Nita Crow  Sent: 12/2/2022 11:31 AM EST  Subject: Donnamaria Meghna    I let my refills run out. I am desperate need of my Zofran. Dr Karen Calderon knows I deal with nausea often. If you could send a script to my Pharmacy please.      Thank [address] [place] [address]   [phone]

## 2022-12-07 DIAGNOSIS — B00.1 HERPES LABIALIS: ICD-10-CM

## 2022-12-07 RX ORDER — VALACYCLOVIR HYDROCHLORIDE 1 G/1
TABLET, FILM COATED ORAL
Qty: 12 TABLET | Refills: 1 | Status: SHIPPED | OUTPATIENT
Start: 2022-12-07

## 2022-12-22 RX ORDER — OLMESARTAN MEDOXOMIL 40 MG/1
TABLET ORAL
Qty: 90 TABLET | Refills: 1 | Status: SHIPPED | OUTPATIENT
Start: 2022-12-22

## 2023-01-16 ENCOUNTER — PATIENT MESSAGE (OUTPATIENT)
Dept: INTERNAL MEDICINE CLINIC | Facility: CLINIC | Age: 53
End: 2023-01-16

## 2023-01-16 DIAGNOSIS — B96.89 BACTERIAL VAGINOSIS: Primary | ICD-10-CM

## 2023-01-16 DIAGNOSIS — N76.0 BACTERIAL VAGINOSIS: Primary | ICD-10-CM

## 2023-01-17 RX ORDER — METRONIDAZOLE 500 MG/1
500 TABLET ORAL 2 TIMES DAILY
Qty: 14 TABLET | Refills: 0 | Status: SHIPPED | OUTPATIENT
Start: 2023-01-17 | End: 2023-01-24

## 2023-01-17 NOTE — TELEPHONE ENCOUNTER
From: Gabriel Hamm  To: Dr. Arturo Mcclelland Brothers  Sent: 1/16/2023 9:28 AM EST  Subject: Medication Request    My Humeria Injections cause weird things to happen with my body, as we discussed. Could I please get a prescription of Flagyl sent to my pharmacy as soon as possible. I know its a bacterial infection bc I have already treated myself for a yeast infection & havent been sexually active. My pharmacy is . [address] [place] [address]. Lor Irving. [phone].     Thank You   &   God [facility]  [phone]

## 2023-05-19 ENCOUNTER — OFFICE VISIT (OUTPATIENT)
Dept: INTERNAL MEDICINE CLINIC | Facility: CLINIC | Age: 53
End: 2023-05-19
Payer: MEDICARE

## 2023-05-19 VITALS
RESPIRATION RATE: 16 BRPM | HEART RATE: 84 BPM | OXYGEN SATURATION: 98 % | SYSTOLIC BLOOD PRESSURE: 136 MMHG | WEIGHT: 157 LBS | HEIGHT: 67 IN | BODY MASS INDEX: 24.64 KG/M2 | DIASTOLIC BLOOD PRESSURE: 80 MMHG

## 2023-05-19 DIAGNOSIS — F43.10 PTSD (POST-TRAUMATIC STRESS DISORDER): ICD-10-CM

## 2023-05-19 DIAGNOSIS — F32.3 PSYCHOTIC DEPRESSION (HCC): Primary | ICD-10-CM

## 2023-05-19 DIAGNOSIS — D50.8 OTHER IRON DEFICIENCY ANEMIA: ICD-10-CM

## 2023-05-19 DIAGNOSIS — M45.6 ANKYLOSING SPONDYLITIS LUMBAR REGION (HCC): ICD-10-CM

## 2023-05-19 DIAGNOSIS — Z86.39 HISTORY OF HYPOTHYROIDISM: ICD-10-CM

## 2023-05-19 LAB
ALBUMIN SERPL-MCNC: 4.4 G/DL (ref 3.5–5)
ALBUMIN/GLOB SERPL: 1.9 (ref 0.4–1.6)
ALP SERPL-CCNC: 75 U/L (ref 50–136)
ALT SERPL-CCNC: 21 U/L (ref 12–65)
ANION GAP SERPL CALC-SCNC: 4 MMOL/L (ref 2–11)
AST SERPL-CCNC: 20 U/L (ref 15–37)
BILIRUB SERPL-MCNC: 0.9 MG/DL (ref 0.2–1.1)
BUN SERPL-MCNC: 12 MG/DL (ref 6–23)
CALCIUM SERPL-MCNC: 9.5 MG/DL (ref 8.3–10.4)
CHLORIDE SERPL-SCNC: 104 MMOL/L (ref 101–110)
CO2 SERPL-SCNC: 30 MMOL/L (ref 21–32)
CREAT SERPL-MCNC: 0.6 MG/DL (ref 0.6–1)
ERYTHROCYTE [DISTWIDTH] IN BLOOD BY AUTOMATED COUNT: 13.3 % (ref 11.9–14.6)
FERRITIN SERPL-MCNC: 149 NG/ML (ref 8–388)
GLOBULIN SER CALC-MCNC: 2.3 G/DL (ref 2.8–4.5)
GLUCOSE SERPL-MCNC: 90 MG/DL (ref 65–100)
HCT VFR BLD AUTO: 37 % (ref 35.8–46.3)
HGB BLD-MCNC: 11.9 G/DL (ref 11.7–15.4)
IRON SERPL-MCNC: 44 UG/DL (ref 35–150)
MCH RBC QN AUTO: 31.7 PG (ref 26.1–32.9)
MCHC RBC AUTO-ENTMCNC: 32.2 G/DL (ref 31.4–35)
MCV RBC AUTO: 98.7 FL (ref 82–102)
NRBC # BLD: 0 K/UL (ref 0–0.2)
PLATELET # BLD AUTO: 334 K/UL (ref 150–450)
PMV BLD AUTO: 11.4 FL (ref 9.4–12.3)
POTASSIUM SERPL-SCNC: 4.2 MMOL/L (ref 3.5–5.1)
PROT SERPL-MCNC: 6.7 G/DL (ref 6.3–8.2)
RBC # BLD AUTO: 3.75 M/UL (ref 4.05–5.2)
SODIUM SERPL-SCNC: 138 MMOL/L (ref 133–143)
TIBC SERPL-MCNC: 313 UG/DL (ref 250–450)
TSH W FREE THYROID IF ABNORMAL: 1.77 UIU/ML (ref 0.36–3.74)
WBC # BLD AUTO: 6.6 K/UL (ref 4.3–11.1)

## 2023-05-19 PROCEDURE — 3017F COLORECTAL CA SCREEN DOC REV: CPT | Performed by: FAMILY MEDICINE

## 2023-05-19 PROCEDURE — 99214 OFFICE O/P EST MOD 30 MIN: CPT | Performed by: FAMILY MEDICINE

## 2023-05-19 PROCEDURE — G8427 DOCREV CUR MEDS BY ELIG CLIN: HCPCS | Performed by: FAMILY MEDICINE

## 2023-05-19 PROCEDURE — G8420 CALC BMI NORM PARAMETERS: HCPCS | Performed by: FAMILY MEDICINE

## 2023-05-19 PROCEDURE — 1036F TOBACCO NON-USER: CPT | Performed by: FAMILY MEDICINE

## 2023-05-19 RX ORDER — TRAZODONE HYDROCHLORIDE 50 MG/1
TABLET ORAL
COMMUNITY
Start: 2023-04-23

## 2023-05-19 ASSESSMENT — PATIENT HEALTH QUESTIONNAIRE - PHQ9
3. TROUBLE FALLING OR STAYING ASLEEP: 0
6. FEELING BAD ABOUT YOURSELF - OR THAT YOU ARE A FAILURE OR HAVE LET YOURSELF OR YOUR FAMILY DOWN: 0
1. LITTLE INTEREST OR PLEASURE IN DOING THINGS: 1
SUM OF ALL RESPONSES TO PHQ9 QUESTIONS 1 & 2: 2
4. FEELING TIRED OR HAVING LITTLE ENERGY: 0
SUM OF ALL RESPONSES TO PHQ QUESTIONS 1-9: 2
5. POOR APPETITE OR OVEREATING: 0
7. TROUBLE CONCENTRATING ON THINGS, SUCH AS READING THE NEWSPAPER OR WATCHING TELEVISION: 0
8. MOVING OR SPEAKING SO SLOWLY THAT OTHER PEOPLE COULD HAVE NOTICED. OR THE OPPOSITE, BEING SO FIGETY OR RESTLESS THAT YOU HAVE BEEN MOVING AROUND A LOT MORE THAN USUAL: 0
SUM OF ALL RESPONSES TO PHQ QUESTIONS 1-9: 2
10. IF YOU CHECKED OFF ANY PROBLEMS, HOW DIFFICULT HAVE THESE PROBLEMS MADE IT FOR YOU TO DO YOUR WORK, TAKE CARE OF THINGS AT HOME, OR GET ALONG WITH OTHER PEOPLE: 0
2. FEELING DOWN, DEPRESSED OR HOPELESS: 1
SUM OF ALL RESPONSES TO PHQ QUESTIONS 1-9: 2
SUM OF ALL RESPONSES TO PHQ QUESTIONS 1-9: 2
9. THOUGHTS THAT YOU WOULD BE BETTER OFF DEAD, OR OF HURTING YOURSELF: 0

## 2023-05-19 NOTE — PROGRESS NOTES
Shaina Christensen DO  Diplomate of the [employer] [facility] Internal Medicine      Maria Luisa Prakash (: 1970) is a 47 y.o. female, here for evaluation of the following chief complaint(s):  Follow-Up from Hospital       ASSESSMENT/PLAN:  1. Psychotic depression ([address]  2. PTSD (post-traumatic stress disorder)  3. Other iron deficiency anemia  -     CBC; Future  -     Comprehensive Metabolic Panel; Future  -     Iron; Future  -     Total Iron Binding Capacity; Future  -     Ferritin; Future  4. History of hypothyroidism  -     TSH with Reflex; Future  5. Ankylosing spondylitis lumbar region [facility])  -     [address] appears stable today. We will continue with Seroquel as well as Paxil. She is getting good clinical benefit from this. Encouraged her to follow-up with psychiatry.  -We will work on getting her back in with rheumatology. Continue with Aleve on a as needed basis as needed for joint pain at this point.  -We will go ahead and recheck iron studies as well. Continue with supplementation and adjust further if needed.  -We will recheck thyroid levels as well. SUBJECTIVE/OBJECTIVE:  HPI:  -Patient comes in today for follow-up. She was recently hospitalized due to psychotic depression and PTSD. Her medications were adjusted and she is doing better now. She feels like moods are stable. She has not had any further hallucinations. She is followed by psychiatry.  -She does have ankylosing spondylitis, but is currently not on any medication for this. She does admit to having quite a bit of joint pain and stiffness.  -She is also status post gastric bypass and has a history of iron deficiency anemia. She has been taking iron, but has not had iron levels checked in quite some time. ROS negative except as noted above today.     Social History     Tobacco Use    Smoking status: Never    Smokeless tobacco: Never    Tobacco comments:     Quit smoking:

## 2023-05-22 ENCOUNTER — TELEPHONE (OUTPATIENT)
Dept: INTERNAL MEDICINE CLINIC | Facility: CLINIC | Age: 53
End: 2023-05-22

## 2023-06-22 RX ORDER — OLMESARTAN MEDOXOMIL 40 MG/1
TABLET ORAL
Qty: 90 TABLET | Refills: 0 | OUTPATIENT
Start: 2023-06-22

## 2023-07-03 DIAGNOSIS — B00.1 HERPES LABIALIS: ICD-10-CM

## 2023-07-04 RX ORDER — VALACYCLOVIR HYDROCHLORIDE 1 G/1
TABLET, FILM COATED ORAL
Qty: 12 TABLET | Refills: 1 | Status: SHIPPED | OUTPATIENT
Start: 2023-07-04

## 2023-08-11 ENCOUNTER — CARE COORDINATION (OUTPATIENT)
Dept: CARE COORDINATION | Facility: CLINIC | Age: 53
End: 2023-08-11

## 2025-02-20 NOTE — CARE COORDINATION
ACM spoke w/ [employer] [facility] (Dary Mueller), Magdiel Wolfgang who reports pt has been there for 2wks and will receive inhome physician services. [facility] will pay copays for meds while pt remains there. Pt is unsure if she will remain there or for how long. Pt is provided w/ 3meals/day and 3 snacks. Pt is stable at this time. ACM available for CCM needs.
Detail Level: Detailed
Detail Level: Zone

## (undated) DEVICE — MASTISOL ADHESIVE LIQ 2/3ML

## (undated) DEVICE — SUTURE PDS II SZ 0 L27IN ABSRB VLT L26MM CT-2 1/2 CIR Z334H

## (undated) DEVICE — CANNULA ARTHSCP L9MM CLR DISP SEAL TRAC

## (undated) DEVICE — [RESECTOR CUTTER, ARTHROSCOPIC SHAVER BLADE,  DO NOT RESTERILIZE,  DO NOT USE IF PACKAGE IS DAMAGED,  KEEP DRY,  KEEP AWAY FROM SUNLIGHT]: Brand: FORMULA

## (undated) DEVICE — STERILE HOOK LOCK LATEX FREE ELASTIC BANDAGE 6INX5YD: Brand: HOOK LOCK™

## (undated) DEVICE — SUTURE STRATAFIX SZ 3-0 L14CM NONABSORBABLE UD L19MM FS-2 SXMP2B406

## (undated) DEVICE — SET IRRIG DST FLX M CONN

## (undated) DEVICE — SURGICAL PROCEDURE PACK BASIC ST FRANCIS

## (undated) DEVICE — T-DRAPE,EXTREMITY,STERILE: Brand: MEDLINE

## (undated) DEVICE — INTENDED FOR TISSUE SEPARATION, AND OTHER PROCEDURES THAT REQUIRE A SHARP SURGICAL BLADE TO PUNCTURE OR CUT.: Brand: BARD-PARKER ® STAINLESS STEEL BLADES

## (undated) DEVICE — SUTURE VCRL SZ 2-0 L27IN ABSRB UD L26MM CT-2 1/2 CIR J269H

## (undated) DEVICE — BANDAGE COMPR SELF ADH 5 YDX6 IN TAN STRL PREMIERPRO LF

## (undated) DEVICE — SOLUTION IRRIG 3000ML 0.9% SOD CHL FLX CONT 0797208] ICU MEDICAL INC]

## (undated) DEVICE — REM POLYHESIVE ADULT PATIENT RETURN ELECTRODE: Brand: VALLEYLAB

## (undated) DEVICE — SKIN MARKER,REGULAR TIP WITH RULER AND LABELS: Brand: DEVON

## (undated) DEVICE — ZIMMER® STERILE DISPOSABLE TOURNIQUET CUFF WITH PLC, DUAL PORT, SINGLE BLADDER, 30 IN. (76 CM)

## (undated) DEVICE — SUTURE NONABSORBABLE BRAIDED 5-0 30 IN ETHBND EXCEL D7809

## (undated) DEVICE — WATERPROOF, BACTERIA PROOF DRESSING WITH ABSORBENT SEE THROUGH PAD: Brand: OPSITE POST-OP VISIBLE 15X10CM CTN 20

## (undated) DEVICE — [AGGRESSIVE 6-FLUTE BARREL BUR, ARTHROSCOPIC SHAVER BLADE,  DO NOT RESTERILIZE,  DO NOT USE IF PACKAGE IS DAMAGED,  KEEP DRY,  KEEP AWAY FROM SUNLIGHT]: Brand: FORMULA

## (undated) DEVICE — KNIFE SURG 10MM GRFT DISP FOR ACL RECON

## (undated) DEVICE — STOCKINETTE,IMPERVIOUS,12X48,STERILE: Brand: MEDLINE

## (undated) DEVICE — PRECISION THIN (9.0 X 0.38 X 31.0MM)

## (undated) DEVICE — SINGLE PORT MANIFOLD: Brand: NEPTUNE 2

## (undated) DEVICE — BUTTON SWITCH PENCIL BLADE ELECTRODE, HOLSTER: Brand: EDGE

## (undated) DEVICE — SET IRRIG L94IN ID0.281IN W/ 4.5IN DST FLX CONN 2 LD ON OFF

## (undated) DEVICE — NEEDLE HYPO 18GA L1.5IN PNK S STL HUB POLYPR SHLD REG BVL

## (undated) DEVICE — (D)PREP SKN CHLRAPRP APPL 26ML -- CONVERT TO ITEM 371833

## (undated) DEVICE — SET ENDOSCP FIX ACL FOR BNE TEND RECON DISP VERSITOMIC

## (undated) DEVICE — 90-S ACCELERATOR, SUCTION PROBE, NON-BENDABLE, MAX CUT LEVEL 11: Brand: SERFAS ENERGY

## (undated) DEVICE — (D)STRIP SKN CLSR 0.5X4IN WHT --